# Patient Record
Sex: FEMALE | Race: BLACK OR AFRICAN AMERICAN | Employment: UNEMPLOYED | URBAN - METROPOLITAN AREA
[De-identification: names, ages, dates, MRNs, and addresses within clinical notes are randomized per-mention and may not be internally consistent; named-entity substitution may affect disease eponyms.]

---

## 2018-12-26 ENCOUNTER — HOSPITAL ENCOUNTER (OUTPATIENT)
Dept: GENERAL RADIOLOGY | Age: 43
Discharge: HOME | End: 2018-12-26
Payer: COMMERCIAL

## 2018-12-26 DIAGNOSIS — M54.50 LOW BACK PAIN: ICD-10-CM

## 2018-12-26 PROCEDURE — 72110 X-RAY EXAM L-2 SPINE 4/>VWS: CPT

## 2019-01-03 ENCOUNTER — HOSPITAL ENCOUNTER (OUTPATIENT)
Dept: MAMMOGRAPHY | Age: 44
Discharge: HOME | End: 2019-01-03
Attending: FAMILY MEDICINE
Payer: COMMERCIAL

## 2019-01-03 DIAGNOSIS — Z12.39 BREAST CANCER SCREENING: ICD-10-CM

## 2019-01-03 PROCEDURE — 77067 SCR MAMMO BI INCL CAD: CPT

## 2019-02-07 ENCOUNTER — HOSPITAL ENCOUNTER (OUTPATIENT)
Dept: MRI IMAGING | Age: 44
Discharge: HOME | End: 2019-02-07
Attending: ANESTHESIOLOGY
Payer: COMMERCIAL

## 2019-02-07 DIAGNOSIS — M54.16 LUMBAR RADICULOPATHY: ICD-10-CM

## 2019-02-07 DIAGNOSIS — M54.12 CERVICAL RADICULOPATHY: ICD-10-CM

## 2019-02-07 PROCEDURE — A9575 INJ GADOTERATE MEGLUMI 0.1ML: HCPCS

## 2019-02-07 PROCEDURE — 74011250636 HC RX REV CODE- 250/636

## 2019-02-07 PROCEDURE — 72158 MRI LUMBAR SPINE W/O & W/DYE: CPT

## 2019-02-07 PROCEDURE — 72141 MRI NECK SPINE W/O DYE: CPT

## 2019-02-07 RX ORDER — GADOTERATE MEGLUMINE 376.9 MG/ML
22 INJECTION INTRAVENOUS
Status: COMPLETED | OUTPATIENT
Start: 2019-02-07 | End: 2019-02-07

## 2019-02-07 RX ORDER — SODIUM CHLORIDE 0.9 % (FLUSH) 0.9 %
10 SYRINGE (ML) INJECTION
Status: COMPLETED | OUTPATIENT
Start: 2019-02-07 | End: 2019-02-07

## 2019-02-07 RX ADMIN — GADOTERATE MEGLUMINE 22 ML: 376.9 INJECTION INTRAVENOUS at 11:08

## 2019-02-07 RX ADMIN — Medication 10 ML: at 11:08

## 2019-02-21 ENCOUNTER — HOSPITAL ENCOUNTER (OUTPATIENT)
Dept: ULTRASOUND IMAGING | Age: 44
Discharge: HOME | End: 2019-02-21
Attending: ANESTHESIOLOGY
Payer: COMMERCIAL

## 2019-02-21 DIAGNOSIS — E04.2 MULTIPLE THYROID NODULES: ICD-10-CM

## 2019-02-21 PROCEDURE — 76536 US EXAM OF HEAD AND NECK: CPT

## 2020-02-04 ENCOUNTER — HOSPITAL ENCOUNTER (OUTPATIENT)
Dept: MAMMOGRAPHY | Age: 45
Discharge: HOME | End: 2020-02-04
Attending: FAMILY MEDICINE
Payer: COMMERCIAL

## 2020-02-04 DIAGNOSIS — Z12.31 SCREENING MAMMOGRAM FOR HIGH-RISK PATIENT: ICD-10-CM

## 2020-02-04 PROCEDURE — 77067 SCR MAMMO BI INCL CAD: CPT

## 2020-10-30 ENCOUNTER — TRANSCRIBE ORDER (OUTPATIENT)
Dept: SCHEDULING | Age: 45
End: 2020-10-30

## 2020-10-30 DIAGNOSIS — M54.16 LUMBAR RADICULOPATHY: Primary | ICD-10-CM

## 2020-11-06 ENCOUNTER — TRANSCRIBE ORDER (OUTPATIENT)
Dept: SCHEDULING | Age: 45
End: 2020-11-06

## 2020-11-06 DIAGNOSIS — M54.16 LUMBAR RADICULOPATHY: Primary | ICD-10-CM

## 2020-11-19 ENCOUNTER — HOSPITAL ENCOUNTER (OUTPATIENT)
Dept: MRI IMAGING | Age: 45
Discharge: HOME | End: 2020-11-19
Attending: NURSE PRACTITIONER
Payer: COMMERCIAL

## 2020-11-19 DIAGNOSIS — M54.16 LUMBAR RADICULOPATHY: ICD-10-CM

## 2020-11-19 PROCEDURE — 74011250636 HC RX REV CODE- 250/636

## 2020-11-19 PROCEDURE — 72158 MRI LUMBAR SPINE W/O & W/DYE: CPT

## 2020-11-19 PROCEDURE — A9575 INJ GADOTERATE MEGLUMI 0.1ML: HCPCS

## 2020-11-19 RX ORDER — SODIUM CHLORIDE 0.9 % (FLUSH) 0.9 %
10 SYRINGE (ML) INJECTION
Status: COMPLETED | OUTPATIENT
Start: 2020-11-19 | End: 2020-11-19

## 2020-11-19 RX ORDER — GADOTERATE MEGLUMINE 376.9 MG/ML
20 INJECTION INTRAVENOUS
Status: COMPLETED | OUTPATIENT
Start: 2020-11-19 | End: 2020-11-19

## 2020-11-19 RX ADMIN — Medication 10 ML: at 09:54

## 2020-11-19 RX ADMIN — GADOTERATE MEGLUMINE 20 ML: 376.9 INJECTION INTRAVENOUS at 09:54

## 2021-01-08 ENCOUNTER — HOSPITAL ENCOUNTER (OUTPATIENT)
Dept: MRI IMAGING | Age: 46
Discharge: HOME | End: 2021-01-08
Attending: NEUROLOGICAL SURGERY

## 2021-01-08 DIAGNOSIS — R52 BURNING PAIN: ICD-10-CM

## 2021-01-08 DIAGNOSIS — M54.50 CHRONIC BILATERAL LOW BACK PAIN WITHOUT SCIATICA: ICD-10-CM

## 2021-01-08 DIAGNOSIS — R26.89 IMBALANCE: ICD-10-CM

## 2021-01-08 DIAGNOSIS — G89.29 CHRONIC BILATERAL LOW BACK PAIN WITHOUT SCIATICA: ICD-10-CM

## 2021-01-08 DIAGNOSIS — R29.898 WEAKNESS OF RIGHT LOWER EXTREMITY: ICD-10-CM

## 2021-01-11 ENCOUNTER — TRANSCRIBE ORDER (OUTPATIENT)
Dept: SCHEDULING | Age: 46
End: 2021-01-11

## 2021-01-11 DIAGNOSIS — Z12.31 VISIT FOR SCREENING MAMMOGRAM: Primary | ICD-10-CM

## 2021-01-18 ENCOUNTER — HOSPITAL ENCOUNTER (OUTPATIENT)
Dept: MRI IMAGING | Age: 46
Discharge: HOME | End: 2021-01-18
Attending: NEUROLOGICAL SURGERY
Payer: COMMERCIAL

## 2021-01-18 PROCEDURE — 72157 MRI CHEST SPINE W/O & W/DYE: CPT

## 2021-01-18 PROCEDURE — 74011250636 HC RX REV CODE- 250/636: Performed by: NEUROLOGICAL SURGERY

## 2021-01-18 PROCEDURE — A9575 INJ GADOTERATE MEGLUMI 0.1ML: HCPCS | Performed by: NEUROLOGICAL SURGERY

## 2021-01-18 RX ORDER — SODIUM CHLORIDE 0.9 % (FLUSH) 0.9 %
10 SYRINGE (ML) INJECTION
Status: COMPLETED | OUTPATIENT
Start: 2021-01-18 | End: 2021-01-18

## 2021-01-18 RX ORDER — GADOTERATE MEGLUMINE 376.9 MG/ML
20 INJECTION INTRAVENOUS
Status: COMPLETED | OUTPATIENT
Start: 2021-01-18 | End: 2021-01-18

## 2021-01-18 RX ADMIN — Medication 10 ML: at 08:28

## 2021-01-18 RX ADMIN — GADOTERATE MEGLUMINE 20 ML: 376.9 INJECTION INTRAVENOUS at 08:28

## 2021-02-08 ENCOUNTER — HOSPITAL ENCOUNTER (OUTPATIENT)
Dept: MAMMOGRAPHY | Age: 46
Discharge: HOME | End: 2021-02-08
Attending: FAMILY MEDICINE
Payer: COMMERCIAL

## 2021-02-08 DIAGNOSIS — Z12.31 VISIT FOR SCREENING MAMMOGRAM: ICD-10-CM

## 2021-02-08 PROCEDURE — 77067 SCR MAMMO BI INCL CAD: CPT

## 2021-10-07 ENCOUNTER — APPOINTMENT (OUTPATIENT)
Dept: GENERAL RADIOLOGY | Age: 46
End: 2021-10-07
Attending: ANESTHESIOLOGY
Payer: COMMERCIAL

## 2021-10-07 ENCOUNTER — HOSPITAL ENCOUNTER (OUTPATIENT)
Age: 46
Setting detail: OUTPATIENT SURGERY
Discharge: HOME | End: 2021-10-07
Attending: ANESTHESIOLOGY | Admitting: ANESTHESIOLOGY
Payer: COMMERCIAL

## 2021-10-07 VITALS
TEMPERATURE: 97.5 F | WEIGHT: 220 LBS | DIASTOLIC BLOOD PRESSURE: 69 MMHG | BODY MASS INDEX: 43.19 KG/M2 | RESPIRATION RATE: 15 BRPM | SYSTOLIC BLOOD PRESSURE: 107 MMHG | HEART RATE: 82 BPM | HEIGHT: 60 IN | OXYGEN SATURATION: 100 %

## 2021-10-07 PROCEDURE — 77030003545 HC NDL EPDRL EPIM -B: Performed by: ANESTHESIOLOGY

## 2021-10-07 PROCEDURE — 74011000250 HC RX REV CODE- 250: Performed by: ANESTHESIOLOGY

## 2021-10-07 PROCEDURE — 74011250636 HC RX REV CODE- 250/636: Performed by: ANESTHESIOLOGY

## 2021-10-07 PROCEDURE — 77030014125 HC TY EPDRL BBMI -B: Performed by: ANESTHESIOLOGY

## 2021-10-07 PROCEDURE — A4300 CATH IMPL VASC ACCESS PORTAL: HCPCS | Performed by: ANESTHESIOLOGY

## 2021-10-07 PROCEDURE — 77030003543 HC NDL EPDRL BBMI -A: Performed by: ANESTHESIOLOGY

## 2021-10-07 PROCEDURE — 76210000020 HC REC RM PH II FIRST 0.5 HR: Performed by: ANESTHESIOLOGY

## 2021-10-07 PROCEDURE — 76210000006 HC OR PH I REC 0.5 TO 1 HR: Performed by: ANESTHESIOLOGY

## 2021-10-07 PROCEDURE — 74011000636 HC RX REV CODE- 636: Performed by: ANESTHESIOLOGY

## 2021-10-07 PROCEDURE — 76010000154 HC OR TIME FIRST 0.5 HR: Performed by: ANESTHESIOLOGY

## 2021-10-07 PROCEDURE — 2709999900 HC NON-CHARGEABLE SUPPLY: Performed by: ANESTHESIOLOGY

## 2021-10-07 RX ORDER — SODIUM CHLORIDE 9 MG/ML
INJECTION INTRAMUSCULAR; INTRAVENOUS; SUBCUTANEOUS AS NEEDED
Status: DISCONTINUED | OUTPATIENT
Start: 2021-10-07 | End: 2021-10-07 | Stop reason: HOSPADM

## 2021-10-07 RX ORDER — MIDAZOLAM HYDROCHLORIDE 1 MG/ML
2 INJECTION, SOLUTION INTRAMUSCULAR; INTRAVENOUS ONCE
Status: COMPLETED | OUTPATIENT
Start: 2021-10-07 | End: 2021-10-07

## 2021-10-07 RX ORDER — TRIAMCINOLONE ACETONIDE 40 MG/ML
INJECTION, SUSPENSION INTRA-ARTICULAR; INTRAMUSCULAR AS NEEDED
Status: DISCONTINUED | OUTPATIENT
Start: 2021-10-07 | End: 2021-10-07 | Stop reason: HOSPADM

## 2021-10-07 RX ORDER — LIDOCAINE HYDROCHLORIDE 10 MG/ML
INJECTION INFILTRATION; PERINEURAL AS NEEDED
Status: DISCONTINUED | OUTPATIENT
Start: 2021-10-07 | End: 2021-10-07 | Stop reason: HOSPADM

## 2021-10-07 RX ADMIN — MIDAZOLAM 2 MG: 1 INJECTION INTRAMUSCULAR; INTRAVENOUS at 08:30

## 2021-10-07 NOTE — PROGRESS NOTES
's pre-procedure visit and prayer with patient as requested.     Estefany Valenzuela MDiv, BS  Board Certified

## 2021-10-07 NOTE — BRIEF OP NOTE
Brief Postoperative Note    Patient: Garth Bellamy  YOB: 1975  MRN: [id number]    Date of Procedure: 10/7/2021     Pre-Op Diagnosis: Lumbar radiculopathy [M54.16]    Post-Op Diagnosis: Same as preoperative diagnosis.       Procedure(s):  CAUDAL  EPIDURAL STEROID INJECTION WITH LYSIS OF ADHESIONS    Surgeon(s):  Kimberley Barnes MD    Surgical Assistant: None    Anesthesia: Con-Sed     Estimated Blood Loss (mL): Minimal    Complications: None    Specimens: * No specimens in log *     Implants: * No implants in log *    Drains: * No LDAs found *    Findings: None    Electronically Signed by Jorge Santana MD on 10/7/2021 at 9:00 AM

## 2021-10-07 NOTE — H&P
History and Physical  .         History and Physical    Patient: Renita Quintanilla MRN: [id number]  SSN: [id number]    YOB: 1975  Age: 55 y.o. Sex: female      Subjective:      Renita Quintanilla is a 55 y.o. female who has chronic low back and leg pain s/p L5 laminectomy presenting for a caudal epidural lysis of adhesions. Past Medical History:   Diagnosis Date    Anxiety     AR (allergic rhinitis)     Arthritis     back    Asthma     rescue inhaler    Chronic back pain          Chronic pain syndrome     Depression     History of methicillin resistant Staph aureus     Hypertension     managed with meds    Morbid obesity ([address]      55 bmi    Neuropathy of right foot          Pure hypercholesterolemia     Sciatica     Sinus problem      Past Surgical History:   Procedure Laterality Date    HX BACK SURGERY  2015    spinal cord stimulator trial- removed    HX  SECTION      x2    HX GYN  2015     uterine ablation    Isauro Ayala (x 2)   Isauro Borjas (x1),    HX TUBAL LIGATION      w last CS      Family History   Problem Relation Age of Onset   [employer] Hypertension Mother     Heart Disease Mother    [employer] Stroke Mother     Diabetes Mother    [employer] Delayed Awakening Mother     Sleep Apnea Mother     Cancer Father         prostate    Diabetes Father     Hypertension Father     Asthma Father     High Cholesterol Brother     Sleep Apnea Brother     Other Other         brain tumor    Arthritis-osteo Other     Breast Cancer Neg Hx     Colon Cancer Neg Hx     Ovarian Cancer Neg Hx      Social History     Tobacco Use    Smoking status: Never Smoker    Smokeless tobacco: Never Used   Substance Use Topics    Alcohol use: Yes     Comment: 1-2 drinks per month      Prior to Admission medications    Medication Sig Start Date End Date Taking?  Authorizing Provider   hydrALAZINE (APRESOLINE) 25 mg tablet Take 25 mg by mouth daily. Yes Provider, Historical   HYDROcodone-acetaminophen (NORCO) 7.5-325 mg per tablet Take  by mouth every six (6) hours as needed for Pain. Yes Provider, Historical   acetaminophen (Tylenol Extra Strength) 500 mg tablet Take  by mouth as needed for Pain. Yes Provider, Historical   azelastine-fluticasone 137-50 mcg/spray spry SPRAY 1 SPRAY INTO EACH NOSTRIL TWICE A DAY 12/8/20  Yes Provider, Historical   olopatadine (PATADAY) 0.2 % drop ophthalmic solution INSTILL 1 DROP INTO BOTH EYES DAILY 10/31/20  Yes Provider, Historical   losartan (COZAAR) 100 mg tablet TAKE 1 TABLET BY MOUTH EVERY DAY 2/24/20  Yes Provider, Historical   amLODIPine (NORVASC) 10 mg tablet Take 1 Tab by mouth every morning. 10/29/18  Yes Dilcia Moya MD   montelukast (SINGULAIR) 10 mg tablet Take 1 Tab by mouth daily. Patient taking differently: Take 10 mg by mouth nightly. 9/13/16  Yes Dilcia Moya MD   MULTIVITAMIN PO Take 1 Tab by mouth every morning. hold until after surgery 12/16/15  Indications: hold until after surgery 12/16/15   Yes Provider, Historical   orlistat (XENICAL) 120 mg capsule Take 120 mg by mouth three (3) times daily (with meals). 1/25/21 1/25/22  Provider, Historical   ibuprofen (MOTRIN) 200 mg tablet Take  by mouth as needed for Pain. Provider, Historical   naproxen sodium (Aleve) 220 mg cap Take  by mouth as needed. Provider, Historical   phentermine 30 mg capsule Take 30 mg by mouth Every morning. 12/21/20   Provider, Historical   guaiFENesin-codeine (GUAIFENESIN AC) 100-10 mg/5 mL solution Take 5 mL by mouth four (4) times daily as needed for Cough. Max Daily Amount: 20 mL. Indications: Cough 4/27/18   Dilcia Moya MD        Allergies   Allergen Reactions    Adhesive Rash    Pcn [Penicillins] Itching and Swelling       Review of Systems:  A comprehensive review of systems was negative except for that written in the History of Present Illness.     Objective: Vitals:    10/07/21 0739 10/07/21 0744   BP: (!) 149/93    Pulse: (!) 108    Resp: 18    Temp: 98.2 °F (36.8 °C)    SpO2: 100%    Weight: 99.8 kg (220 lb)    Height:  5' (1.524 m)        Physical Exam:  GENERAL: alert, cooperative, no distress, appears stated age  Heart: RRR  Chest CTAB/L    Assessment:     Hospital Problems  Date Reviewed: 10/7/2021    None          Plan:     Caudal epidural lysis of adhesions with steroid injection with sedation    Signed By: Melissa Bhat MD     October 7, 2021

## 2021-10-07 NOTE — OP NOTES
Location: [facility]    Procedure: Caudal epidural lysis of adhesions with steroid injection with epidurography and sedation    Surgeon: Katty Cooney MD    Preoperative Diagnosis: Lumbar radiculitis and failed back surgery syndrome    Description: With consent obtained and the patient in the prone position on an abdominal bolster, with blood pressure, heart rate, and oxygen saturation monitored, the patient was prepped and draped in sterile fashion using Betadine. IV sedation was employed using midazolam. We proceeded to identify the sacral hiatus under fluoroscopy. The skin and subcutaneous tissue was anesthetized with 1% lidocaine. An 17 gauge 3.5 inch Tuohy needle was used under fluoroscopic guidance to be placed through the sacral hiatus and up to the level of S3. Placement was confirmed with injection of Omnipaque 240 using AP and lateral imaging. There was spread after injection of 3mL of Omnipaque to the sacral nerve roots and epidural space stopping at the L5/S1 junction. There was no vascular uptake, CSF spread, or paresthesia elicited. At this point, a 19 gauge Brevi Cath XL was used to advance to the right gutter and advanced to L5/S1. Injection of contrast showed spread around the S1 nerve root and hyaluronidase 150 units in 9mL of preservative free saline injected. The catherter could not be further advanced and a mixture of Kenalog 40 mg in preservative-free saline and 1% preservative-free lidocaine was injected for a total volume of 5mL. The needle was removed and a bandage applied. The patient tolerated the procedure well and there were no apparent complications. Postoperatively there was no change in lower extremity strength or sensation. The patient was discharged home in stable condition with a follow up appointment. Blu Cheng

## 2021-10-07 NOTE — DISCHARGE INSTRUCTIONS
Ice to area for 24 hours, no heat. ACTIVITY  · As tolerated and as directed by your doctor. · Bathe or shower as directed by your doctor. DIET  · Clear liquids until no nausea or vomiting; then light diet for the first day. · Advance to regular diet on second day, unless your doctor orders otherwise. · If nausea and vomiting continues, call your doctor. PAIN  · Take pain medication as directed by your doctor. · Call your doctor if pain is NOT relieved by medication. · DO NOT take aspirin of blood thinners unless directed by your doctor. MEDICATION INTERACTION:During your procedure you potentially received a medication or medications which may reduce the effectiveness of oral contraceptives. Please consider other forms of contraception for 1 month following your procedure if you are currently using oral contraceptives as your primary form of birth control. In addition to this, we recommend continuing your oral contraceptive as prescribed, unless otherwise instructed by your physician, during this time      [address] IF   · Excessive bleeding that does not stop after holding pressure over the area  · Temperature of 101 degrees F or above  · Excessive redness, swelling or bruising, and/ or green or yellow, smelly discharge from incision    After general anesthesia or intravenous sedation, for 24 hours or while taking prescription Narcotics:  · Limit your activities  · A responsible adult needs to be with you for the next 24 hours  · Do not drive and operate hazardous machinery  · Do not make important personal or business decisions  · Do not drink alcoholic beverages  · If you have not urinated within 8 hours after discharge, and you are experiencing discomfort from urinary retention, please go to the nearest ED. · If you have sleep apnea and have a CPAP machine, please use it for all naps and sleeping.   · Please use caution when taking narcotics and any of your home medications that may cause drowsiness. *  Please give a list of your current medications to your Primary Care Provider. *  Please update this list whenever your medications are discontinued, doses are      changed, or new medications (including over-the-counter products) are added. *  Please carry medication information at all times in case of emergency situations. These are general instructions for a healthy lifestyle:  No smoking/ No tobacco products/ Avoid exposure to second hand smoke  Surgeon General's Warning:  Quitting smoking now greatly reduces serious risk to your health. Obesity, smoking, and sedentary lifestyle greatly increases your risk for illness  A healthy diet, regular physical exercise & weight monitoring are important for maintaining a healthy lifestyle    You may be retaining fluid if you have a history of heart failure or if you experience any of the following symptoms:  Weight gain of 3 pounds or more overnight or 5 pounds in a week, increased swelling in our hands or feet or shortness of breath while lying flat in bed. Please call your doctor as soon as you notice any of these symptoms; do not wait until your next office visit.

## 2022-01-13 ENCOUNTER — TRANSCRIBE ORDER (OUTPATIENT)
Dept: SCHEDULING | Age: 47
End: 2022-01-13

## 2022-01-13 DIAGNOSIS — Z12.31 VISIT FOR SCREENING MAMMOGRAM: Primary | ICD-10-CM

## 2022-02-11 ENCOUNTER — HOSPITAL ENCOUNTER (OUTPATIENT)
Dept: MAMMOGRAPHY | Age: 47
Discharge: HOME | End: 2022-02-11
Attending: FAMILY MEDICINE
Payer: COMMERCIAL

## 2022-02-11 DIAGNOSIS — Z12.31 VISIT FOR SCREENING MAMMOGRAM: ICD-10-CM

## 2022-02-11 PROCEDURE — 77067 SCR MAMMO BI INCL CAD: CPT

## 2022-12-28 ENCOUNTER — HOSPITAL ENCOUNTER (OUTPATIENT)
Dept: MRI IMAGING | Age: 47
Discharge: HOME | End: 2022-12-31
Payer: MEDICARE

## 2022-12-28 DIAGNOSIS — M54.6 PAIN IN THORACIC SPINE: ICD-10-CM

## 2022-12-28 PROCEDURE — A9579 GAD-BASE MR CONTRAST NOS,1ML: HCPCS | Performed by: NURSE PRACTITIONER

## 2022-12-28 PROCEDURE — 6360000004 HC RX CONTRAST MEDICATION: Performed by: NURSE PRACTITIONER

## 2022-12-28 PROCEDURE — 72158 MRI LUMBAR SPINE W/O & W/DYE: CPT

## 2022-12-28 RX ADMIN — GADOTERIDOL 20 ML: 279.3 INJECTION, SOLUTION INTRAVENOUS at 18:18

## 2023-02-06 ENCOUNTER — TRANSCRIBE ORDERS (OUTPATIENT)
Dept: SCHEDULING | Age: 48
End: 2023-02-06

## 2023-02-06 DIAGNOSIS — Z12.31 SCREENING MAMMOGRAM FOR HIGH-RISK PATIENT: Primary | ICD-10-CM

## 2023-02-14 ENCOUNTER — HOSPITAL ENCOUNTER (OUTPATIENT)
Dept: MAMMOGRAPHY | Age: 48
Discharge: HOME | End: 2023-02-17
Payer: COMMERCIAL

## 2023-02-14 DIAGNOSIS — Z12.31 SCREENING MAMMOGRAM FOR HIGH-RISK PATIENT: ICD-10-CM

## 2023-02-14 PROCEDURE — 77067 SCR MAMMO BI INCL CAD: CPT

## 2023-07-05 ENCOUNTER — OFFICE VISIT (OUTPATIENT)
Dept: NEUROSURGERY | Age: 48
End: 2023-07-05
Payer: MEDICARE

## 2023-07-05 VITALS
BODY MASS INDEX: 46.55 KG/M2 | HEIGHT: 60 IN | TEMPERATURE: 97.3 F | OXYGEN SATURATION: 98 % | HEART RATE: 114 BPM | SYSTOLIC BLOOD PRESSURE: 140 MMHG | WEIGHT: 237.13 LBS | DIASTOLIC BLOOD PRESSURE: 91 MMHG

## 2023-07-05 DIAGNOSIS — Z98.890 HISTORY OF LUMBAR DISCECTOMY: ICD-10-CM

## 2023-07-05 DIAGNOSIS — G89.4 CHRONIC PAIN SYNDROME: ICD-10-CM

## 2023-07-05 DIAGNOSIS — M48.061 NEUROFORAMINAL STENOSIS OF LUMBAR SPINE: ICD-10-CM

## 2023-07-05 DIAGNOSIS — E66.01 MORBID OBESITY WITH BMI OF 45.0-49.9, ADULT (HCC): ICD-10-CM

## 2023-07-05 DIAGNOSIS — Z98.890 HISTORY OF LUMBAR LAMINECTOMY: Primary | ICD-10-CM

## 2023-07-05 PROCEDURE — 1036F TOBACCO NON-USER: CPT | Performed by: NEUROLOGICAL SURGERY

## 2023-07-05 PROCEDURE — 3080F DIAST BP >= 90 MM HG: CPT | Performed by: NEUROLOGICAL SURGERY

## 2023-07-05 PROCEDURE — G8427 DOCREV CUR MEDS BY ELIG CLIN: HCPCS | Performed by: NEUROLOGICAL SURGERY

## 2023-07-05 PROCEDURE — 3077F SYST BP >= 140 MM HG: CPT | Performed by: NEUROLOGICAL SURGERY

## 2023-07-05 PROCEDURE — 99214 OFFICE O/P EST MOD 30 MIN: CPT | Performed by: NEUROLOGICAL SURGERY

## 2023-07-05 PROCEDURE — G8417 CALC BMI ABV UP PARAM F/U: HCPCS | Performed by: NEUROLOGICAL SURGERY

## 2023-07-05 RX ORDER — BACLOFEN 10 MG/1
10 TABLET ORAL 3 TIMES DAILY
COMMUNITY

## 2023-07-05 NOTE — PROGRESS NOTES
[facility] NOTE:   History of Present Illness:    CC: Follow-up evaluation of chronic low back and right lower extremity pain    Aracely Rubio is a 52 y.o. female who presents today for follow-up evaluation of chronic low back pain and right lower extremity pain. The patient has undergone conservative measures she is now doing water therapy which she is happy with. She has a constant low back pain that she rates as a 5 out of 10 on a visual analog pain scale. She states that she is beginning to experience some left lower extremity pain as well as the right lower extremity pain. The patient has a past surgical history significant for a right-sided L5-S1 disc herniation that was treated with a right-sided L5-S1 laminotomy and discectomy and foraminotomies performed on 12/31/2013 she then subsequently underwent a redo right L5-S1 laminotomy and discectomy for recurrent right-sided L5-S1 disc herniation performed on 9/22/2014, she was treated with a spinal cord stimulator placement performed 9/14/2015 for postlaminectomy syndrome with low back and right leg pain, followed by spinal cord stimulator removal for treatment of a spinal cord stimulator lead infection performed on 12/21/2015. All the aforementioned surgeries were performed by Dr. Emely Eason an orthopedic spine surgeon with [facility]. She underwent an EMG/NCV study performed on 1/26/2022 that demonstrated electrodiagnostic evidence most consistent with a chronic S1 radiculopathy without evidence of peripheral neuropathy or left lower limb radiculopathy. She has undergone 2 more epidural steroid injections since her last visit here on 2/2/2022 and states that she gets a short relief of pain for a few hours. The patient has an MRI lumbar spine with and without contrast demonstrating complete disc height collapse at L5-S1 with slight retrolisthesis of L5 in relation to S1.   There is

## 2023-07-05 NOTE — PROGRESS NOTES
Review of Systems    Constitutional:                    No recent weight changes, fever, fatiguePOSITIVE sleep difficulties, loss of appetite   ENT/Mouth:  No hearing loss, ringing in the ears, POSITIVE chronic sinus problem, nose bleeds,sore throat, voice change, hoarseness, swollen glands in neck, or difficulties with chewing and swallowing. Cardiovascular:  No chest pain/angina pectoris, palpitations, POSITIVE swelling of feet/ankles/hands, POSITIVE or calf pain while walking. Respiratory: No chronic or frequent coughs, spitting up blood, shortness of breath, POSITIVE asthma, or wheezing. Gastrointestinal: No a bdominal pain, heartburn, nausea, vomiting, constipation, or frequent diarrhea     Genitourinary: No frequent urination, burning or painful urination, or blood in urine     Musculoskeletal:   POSITIVE ALLjoint pain, stiffness/swelling, weakness of muscles, or muscle pain/cramp     Integument:   POSITIVE rash/itching     Neurological:  POSITIVE dizziness/vertigo,POSITIVE numbness/tingling sensation, tremors, POSITIVE weakness in limbs, frequent or recurring headaches, memory loss or confusion. Endocrine: No excessive thirst or urination, heat or cold intolerance.

## 2023-07-24 ENCOUNTER — OFFICE VISIT (OUTPATIENT)
Dept: ORTHOPEDIC SURGERY | Age: 48
End: 2023-07-24
Payer: MEDICARE

## 2023-07-24 VITALS — BODY MASS INDEX: 46.53 KG/M2 | WEIGHT: 237 LBS | HEIGHT: 60 IN

## 2023-07-24 DIAGNOSIS — M54.42 CHRONIC LOW BACK PAIN WITH BILATERAL SCIATICA, UNSPECIFIED BACK PAIN LATERALITY: Primary | ICD-10-CM

## 2023-07-24 DIAGNOSIS — M54.41 CHRONIC LOW BACK PAIN WITH BILATERAL SCIATICA, UNSPECIFIED BACK PAIN LATERALITY: Primary | ICD-10-CM

## 2023-07-24 DIAGNOSIS — M51.36 DDD (DEGENERATIVE DISC DISEASE), LUMBAR: ICD-10-CM

## 2023-07-24 DIAGNOSIS — G89.29 CHRONIC LOW BACK PAIN WITH BILATERAL SCIATICA, UNSPECIFIED BACK PAIN LATERALITY: Primary | ICD-10-CM

## 2023-07-24 PROCEDURE — 1036F TOBACCO NON-USER: CPT | Performed by: ORTHOPAEDIC SURGERY

## 2023-07-24 PROCEDURE — G8428 CUR MEDS NOT DOCUMENT: HCPCS | Performed by: ORTHOPAEDIC SURGERY

## 2023-07-24 PROCEDURE — 99203 OFFICE O/P NEW LOW 30 MIN: CPT | Performed by: ORTHOPAEDIC SURGERY

## 2023-07-24 PROCEDURE — G8417 CALC BMI ABV UP PARAM F/U: HCPCS | Performed by: ORTHOPAEDIC SURGERY

## 2023-10-02 ENCOUNTER — OFFICE VISIT (OUTPATIENT)
Dept: SURGERY | Age: 48
End: 2023-10-02
Payer: MEDICARE

## 2023-10-02 VITALS
HEART RATE: 121 BPM | HEIGHT: 61 IN | SYSTOLIC BLOOD PRESSURE: 143 MMHG | DIASTOLIC BLOOD PRESSURE: 93 MMHG | BODY MASS INDEX: 44.93 KG/M2 | WEIGHT: 238 LBS

## 2023-10-02 DIAGNOSIS — Z13.21 ENCOUNTER FOR VITAMIN DEFICIENCY SCREENING: ICD-10-CM

## 2023-10-02 DIAGNOSIS — Z01.812 ENCOUNTER FOR PRE-OPERATIVE LABORATORY TESTING: ICD-10-CM

## 2023-10-02 DIAGNOSIS — K21.9 GASTROESOPHAGEAL REFLUX DISEASE WITHOUT ESOPHAGITIS: ICD-10-CM

## 2023-10-02 DIAGNOSIS — I10 PRIMARY HYPERTENSION: ICD-10-CM

## 2023-10-02 DIAGNOSIS — E66.01 OBESITY, CLASS III, BMI 40-49.9 (MORBID OBESITY) (HCC): ICD-10-CM

## 2023-10-02 DIAGNOSIS — E66.01 MORBID OBESITY (HCC): Primary | ICD-10-CM

## 2023-10-02 DIAGNOSIS — E66.01 MORBID OBESITY (HCC): ICD-10-CM

## 2023-10-02 DIAGNOSIS — Z71.89 ENCOUNTER FOR PRE-BARIATRIC SURGERY COUNSELING AND EDUCATION: ICD-10-CM

## 2023-10-02 LAB
25(OH)D3 SERPL-MCNC: 27.5 NG/ML (ref 30–100)
EST. AVERAGE GLUCOSE BLD GHB EST-MCNC: 131 MG/DL
FERRITIN SERPL-MCNC: 123 NG/ML (ref 8–388)
FOLATE SERPL-MCNC: 7.3 NG/ML (ref 3.1–17.5)
HBA1C MFR BLD: 6.2 % (ref 4.8–5.6)
IRON SERPL-MCNC: 85 UG/DL (ref 35–150)
TSH, 3RD GENERATION: 0.82 UIU/ML (ref 0.36–3.74)
VIT B12 SERPL-MCNC: 361 PG/ML (ref 193–986)

## 2023-10-02 PROCEDURE — APPSS30 APP SPLIT SHARED TIME 16-30 MINUTES: Performed by: PHYSICIAN ASSISTANT

## 2023-10-02 PROCEDURE — 3080F DIAST BP >= 90 MM HG: CPT | Performed by: SURGERY

## 2023-10-02 PROCEDURE — 1036F TOBACCO NON-USER: CPT | Performed by: SURGERY

## 2023-10-02 PROCEDURE — 3077F SYST BP >= 140 MM HG: CPT | Performed by: SURGERY

## 2023-10-02 PROCEDURE — 99204 OFFICE O/P NEW MOD 45 MIN: CPT | Performed by: SURGERY

## 2023-10-05 LAB
COTININE SERPL-MCNC: <1 NG/ML
NICOTINE SERPL-MCNC: <1 NG/ML

## 2023-10-20 ENCOUNTER — HOSPITAL ENCOUNTER (OUTPATIENT)
Dept: PHYSICAL THERAPY | Age: 48
Setting detail: RECURRING SERIES
Discharge: HOME | End: 2023-10-23
Attending: SURGERY
Payer: MEDICARE

## 2023-10-20 PROCEDURE — 97162 PT EVAL MOD COMPLEX 30 MIN: CPT

## 2023-10-20 NOTE — THERAPY EVALUATION
Ludwig Dhillon  : 1975  Primary: Medicare Part A And B (Medicare)  Secondary: [facility] @ [address] [place] [address] [facility] [address]  Phone: [phone]  Fax: [phone]    Plan of Care/Certification Expiration Date: 10/20/23      PT Visit Info:    Plan of Care/Certification Expiration Date: 10/20/23  Total # of Visits to Date: 1      Visit Count:  1                OUTPATIENT PHYSICAL THERAPY:             Initial Assessment 10/20/2023               Episode  Appt Desk         Treatment Diagnosis:  Difficulty in walking (R26.2); Generalized muscle weakness (M62.81)   Medical/Referring Diagnosis:  Morbid obesity ([address]) [E66.01]  Encounter for pre-operative laboratory testing [Z01.812]  Encounter for pre-bariatric surgery counseling and education [Z71.89]  Referring Physician:  Yan Sweeney MD MD Orders:  PT Eval and Treat   Return MD Appt:  tbd  Date of Onset:       Allergies:  Latex, Adhesive tape, Penicillins, Pollen extract, Cat hair extract, Dog epithelium allergy skin test, and Dust mite extract  Restrictions/Precautions:           Medications Last Reviewed:  10/20/2023     SUBJECTIVE   History of Injury/Illness (Reason for Referral):  Patient is here for the PT portion of the surgical weight loss program. Pt has started walking around her home and is doing 10-15 min twice daily . She has not been doing any strengthening so we went over ex in notebook with resistance bands and issued bands for home. Patient Stated Goal(s):  \"to learn exercises\"  Initial:   7   /10 Post Session:      /10  Pt has chronic back pain.   Past Medical History/Comorbidities:   Ms. Daniel Tyson  has a past medical history of Anxiety, AR (allergic rhinitis), Arthritis, Asthma, Chronic back pain, Chronic pain syndrome, Depression, History of methicillin resistant Staph aureus, Hypertension, Morbid obesity ([address]), Neuropathy of right foot, Pure hypercholesterolemia,

## 2023-10-23 ENCOUNTER — HOSPITAL ENCOUNTER (OUTPATIENT)
Dept: GENERAL RADIOLOGY | Age: 48
Discharge: HOME | End: 2023-10-26
Attending: SURGERY
Payer: COMMERCIAL

## 2023-10-23 ENCOUNTER — OFFICE VISIT (OUTPATIENT)
Dept: SURGERY | Age: 48
End: 2023-10-23
Payer: COMMERCIAL

## 2023-10-23 VITALS
SYSTOLIC BLOOD PRESSURE: 157 MMHG | HEIGHT: 61 IN | DIASTOLIC BLOOD PRESSURE: 92 MMHG | WEIGHT: 236 LBS | BODY MASS INDEX: 44.56 KG/M2 | HEART RATE: 110 BPM

## 2023-10-23 DIAGNOSIS — K21.9 GASTROESOPHAGEAL REFLUX DISEASE WITHOUT ESOPHAGITIS: ICD-10-CM

## 2023-10-23 DIAGNOSIS — E66.01 OBESITY, CLASS III, BMI 40-49.9 (MORBID OBESITY) (HCC): ICD-10-CM

## 2023-10-23 DIAGNOSIS — Z71.89 ENCOUNTER FOR PRE-BARIATRIC SURGERY COUNSELING AND EDUCATION: ICD-10-CM

## 2023-10-23 DIAGNOSIS — Z71.82 EXERCISE COUNSELING: ICD-10-CM

## 2023-10-23 DIAGNOSIS — E66.01 MORBID OBESITY (HCC): ICD-10-CM

## 2023-10-23 DIAGNOSIS — E66.01 MORBID OBESITY (HCC): Primary | ICD-10-CM

## 2023-10-23 DIAGNOSIS — Z01.812 ENCOUNTER FOR PRE-OPERATIVE LABORATORY TESTING: ICD-10-CM

## 2023-10-23 DIAGNOSIS — R13.14 PHARYNGOESOPHAGEAL DYSPHAGIA: ICD-10-CM

## 2023-10-23 DIAGNOSIS — Z71.3 DIETARY COUNSELING: ICD-10-CM

## 2023-10-23 DIAGNOSIS — I10 PRIMARY HYPERTENSION: ICD-10-CM

## 2023-10-23 PROCEDURE — 1036F TOBACCO NON-USER: CPT | Performed by: PHYSICIAN ASSISTANT

## 2023-10-23 PROCEDURE — 6370000000 HC RX 637 (ALT 250 FOR IP): Performed by: PHYSICIAN ASSISTANT

## 2023-10-23 PROCEDURE — G8484 FLU IMMUNIZE NO ADMIN: HCPCS | Performed by: PHYSICIAN ASSISTANT

## 2023-10-23 PROCEDURE — 3078F DIAST BP <80 MM HG: CPT | Performed by: PHYSICIAN ASSISTANT

## 2023-10-23 PROCEDURE — G8417 CALC BMI ABV UP PARAM F/U: HCPCS | Performed by: PHYSICIAN ASSISTANT

## 2023-10-23 PROCEDURE — 74240 X-RAY XM UPR GI TRC 1CNTRST: CPT

## 2023-10-23 PROCEDURE — G8427 DOCREV CUR MEDS BY ELIG CLIN: HCPCS | Performed by: PHYSICIAN ASSISTANT

## 2023-10-23 PROCEDURE — 99215 OFFICE O/P EST HI 40 MIN: CPT | Performed by: PHYSICIAN ASSISTANT

## 2023-10-23 PROCEDURE — 3074F SYST BP LT 130 MM HG: CPT | Performed by: PHYSICIAN ASSISTANT

## 2023-10-23 PROCEDURE — 2500000003 HC RX 250 WO HCPCS: Performed by: PHYSICIAN ASSISTANT

## 2023-10-23 RX ADMIN — BARIUM SULFATE 1 TABLET: 700 TABLET ORAL at 09:35

## 2023-10-23 RX ADMIN — ANTACID/ANTIFLATULENT 1 EACH: 380; 550; 10; 10 GRANULE, EFFERVESCENT ORAL at 09:20

## 2023-10-23 RX ADMIN — BARIUM SULFATE 355 ML: 0.6 SUSPENSION ORAL at 09:28

## 2023-10-23 RX ADMIN — BARIUM SULFATE 140 ML: 980 POWDER, FOR SUSPENSION ORAL at 09:20

## 2023-10-24 PROBLEM — M19.90 ARTHRITIS: Status: ACTIVE | Noted: 2019-06-17

## 2023-10-24 PROBLEM — E78.00 PURE HYPERCHOLESTEROLEMIA: Status: ACTIVE | Noted: 2019-06-17

## 2023-10-24 PROBLEM — I10 ESSENTIAL HYPERTENSION, BENIGN: Status: ACTIVE | Noted: 2019-06-17

## 2023-10-24 PROBLEM — J30.9 AR (ALLERGIC RHINITIS): Status: ACTIVE | Noted: 2019-06-17

## 2023-10-24 PROBLEM — G89.4 CHRONIC PAIN DISORDER: Status: ACTIVE | Noted: 2019-06-17

## 2023-10-24 RX ORDER — DIAZEPAM 5 MG/1
TABLET ORAL
COMMUNITY
Start: 2023-09-07

## 2023-10-24 RX ORDER — HYDROCODONE BITARTRATE AND ACETAMINOPHEN 10; 325 MG/1; MG/1
TABLET ORAL
COMMUNITY
Start: 2023-09-26

## 2023-10-24 RX ORDER — HYDROCHLOROTHIAZIDE 25 MG/1
TABLET ORAL
COMMUNITY
Start: 2023-09-20

## 2023-10-24 RX ORDER — PHENTERMINE HYDROCHLORIDE 37.5 MG/1
37.5 CAPSULE ORAL EVERY MORNING
COMMUNITY
Start: 2023-08-26

## 2023-10-24 RX ORDER — BUPRENORPHINE HYDROCHLORIDE 150 UG/1
FILM, SOLUBLE BUCCAL
COMMUNITY
Start: 2023-08-26

## 2023-10-31 ENCOUNTER — OFFICE VISIT (OUTPATIENT)
Dept: BEHAVIORAL/MENTAL HEALTH CLINIC | Facility: CLINIC | Age: 48
End: 2023-10-31
Payer: MEDICARE

## 2023-10-31 DIAGNOSIS — E66.01 OBESITY, MORBID, BMI 40.0-49.9 (HCC): Primary | ICD-10-CM

## 2023-10-31 DIAGNOSIS — F54 PSYCHOLOGICAL AND BEHAVIORAL FACTORS ASSOCIATED WITH DISORDERS OR DISEASES CLASSIFIED ELSEWHERE: ICD-10-CM

## 2023-10-31 PROCEDURE — 90791 PSYCH DIAGNOSTIC EVALUATION: CPT | Performed by: SOCIAL WORKER

## 2023-10-31 PROCEDURE — 1036F TOBACCO NON-USER: CPT | Performed by: SOCIAL WORKER

## 2023-10-31 ASSESSMENT — PATIENT HEALTH QUESTIONNAIRE - PHQ9
1. LITTLE INTEREST OR PLEASURE IN DOING THINGS: 2
9. THOUGHTS THAT YOU WOULD BE BETTER OFF DEAD, OR OF HURTING YOURSELF: 0
SUM OF ALL RESPONSES TO PHQ QUESTIONS 1-9: 5
4. FEELING TIRED OR HAVING LITTLE ENERGY: 1
5. POOR APPETITE OR OVEREATING: 0
2. FEELING DOWN, DEPRESSED OR HOPELESS: 0
8. MOVING OR SPEAKING SO SLOWLY THAT OTHER PEOPLE COULD HAVE NOTICED. OR THE OPPOSITE, BEING SO FIGETY OR RESTLESS THAT YOU HAVE BEEN MOVING AROUND A LOT MORE THAN USUAL: 1
3. TROUBLE FALLING OR STAYING ASLEEP: 1
7. TROUBLE CONCENTRATING ON THINGS, SUCH AS READING THE NEWSPAPER OR WATCHING TELEVISION: 0
SUM OF ALL RESPONSES TO PHQ QUESTIONS 1-9: 5
6. FEELING BAD ABOUT YOURSELF - OR THAT YOU ARE A FAILURE OR HAVE LET YOURSELF OR YOUR FAMILY DOWN: 0
SUM OF ALL RESPONSES TO PHQ9 QUESTIONS 1 & 2: 2

## 2023-10-31 ASSESSMENT — ANXIETY QUESTIONNAIRES
1. FEELING NERVOUS, ANXIOUS, OR ON EDGE: 0
4. TROUBLE RELAXING: 0
6. BECOMING EASILY ANNOYED OR IRRITABLE: 0
5. BEING SO RESTLESS THAT IT IS HARD TO SIT STILL: 1
GAD7 TOTAL SCORE: 3
3. WORRYING TOO MUCH ABOUT DIFFERENT THINGS: 1
7. FEELING AFRAID AS IF SOMETHING AWFUL MIGHT HAPPEN: 1
2. NOT BEING ABLE TO STOP OR CONTROL WORRYING: 0

## 2023-10-31 ASSESSMENT — LIFESTYLE VARIABLES
HOW OFTEN DO YOU HAVE A DRINK CONTAINING ALCOHOL: MONTHLY OR LESS
HOW MANY STANDARD DRINKS CONTAINING ALCOHOL DO YOU HAVE ON A TYPICAL DAY: 1 OR 2

## 2023-10-31 NOTE — PROGRESS NOTES
CONFIDENTIAL PSYCHOLOGICAL REPORT  BARIATRIC SURGERY PSYCHOLOGICAL ASSESSMENT      Patient Pr-[address]    Date of Interview:10/31/2023    Date of Report:10/31/23    Patient Surgery Status:     [x]Approved  []Pended  []Denied    Risk Factors  [x]Mild risk factors  []Mild-moderate risk factors/recommendations given  []Moderate risk factors/recommendations given  []High Risk-significant risk factors/contingencies given  []High Risk- significant risk factors that contraindicate surgery    Chief Complaint: Obesity    Weight: 236 lbs  BMI:  45.33    Gender: female    Medical Record #:[id number]    YOB: 1975    Current Age:48 y.o. Surgeon: Damián    Location of Evaluation: [facility]    Duration: 60 mins    : Tru Pringle. Clarisa Holstein, LISW-CP    Interview Conducted: _XX___ In Office  ____ Virtually( My Chart)  SW and pt were located in the state [facility]. Client was identified by full name before interview began. Informed consent was discussed and obtained. Details regarding the limits of confidentiality, expectations for psychological procedures and services, provider credentials, and client rights and [place] discussed with this individual. Details related to duty to warn were made explicit and client voiced understanding. Patient had capacity to provide full consent, was allowed to ask questions, expressed understanding of the information presented and voluntarily gave consent for evaluation and treatment. From a psychological perspective, this patient has been designated as the following as a candidate for bariatric surgery at the time of this interview:  []Poor  []Marginal []Fair   []Sufficient  []Good [x]Very Good   []Excellent    Summary of Findings: Pt is a 50y.o. year old female,  who presented for psychological evaluation for bariatric surgery.   In review of the data and the information obtained from this assessment, there do not appear to be any

## 2023-11-16 ENCOUNTER — OFFICE VISIT (OUTPATIENT)
Dept: GASTROENTEROLOGY | Age: 48
End: 2023-11-16

## 2023-11-16 VITALS
OXYGEN SATURATION: 96 % | SYSTOLIC BLOOD PRESSURE: 144 MMHG | HEART RATE: 88 BPM | HEIGHT: 60 IN | BODY MASS INDEX: 45.94 KG/M2 | DIASTOLIC BLOOD PRESSURE: 92 MMHG | WEIGHT: 234 LBS | TEMPERATURE: 97.7 F

## 2023-11-16 DIAGNOSIS — R93.3 ABNORMAL UGI SERIES: Primary | ICD-10-CM

## 2023-11-16 DIAGNOSIS — Z80.0 FAMILY HX OF COLON CANCER REQUIRING SCREENING COLONOSCOPY: ICD-10-CM

## 2023-11-16 RX ORDER — OMEPRAZOLE 40 MG/1
CAPSULE, DELAYED RELEASE ORAL
COMMUNITY
Start: 2023-10-24

## 2023-11-16 RX ORDER — CETIRIZINE HYDROCHLORIDE 10 MG/1
TABLET ORAL
COMMUNITY
Start: 2023-10-24

## 2023-11-16 NOTE — PROGRESS NOTES
Allyson Aranda (:  1975) is a 50 y.o. female new patient referred to our office for evaluation of the following chief complaint(s):  Gastroesophageal Reflux           ASSESSMENT/PLAN:  1. Abnormal UGI series  2. Family hx of colon cancer requiring screening colonoscopy       Schedule EGD and colonoscopy. Subjective   SUBJECTIVE/OBJECTIVE  Allyson Aranda is a 50y.o. year old female with PMH is pertinent for HTN, HPL, MO with BMI 45.70, depression, anxiety, chronic back pain, arthritis. Surgical history is pertinent for  x2, uterine ablation, spinal cord stimulator implanted and removed, 2 ligation, partial hysterectomy. Patient was referred to our office for evaluation of GERD. Referral notes reviewed from 10/1 for. Pertinent evaluation to-date includes:     -UGI 10/24/23: mild esophageal dysmotility, barium tablet unable to traverse the GE junction, mild esophageal reflux, possible small traction diverticulum along the mid-thoracic esophagus    Today patient reports she saw ENT 3-4 years ago for hoarseness and was told she had silent acid reflux. She was put on Omeprazole 40 mg QD with improvement in symptoms. Denies nausea, vomiting, abdominal pain, dysphagia, odynophagia. She reports infrequent mild indigestion symptoms. She had UGI done as part of evaluation for gastric sleeve surgery, being followed by Dr. ANGELA [facility]. Denies bowel habit changes, diarrhea, constipation, melena, hematochezia. Her mom was recently diagnosed with colon cancer, diagnosed at 79; she is having surgery next month. Her dad was diagnosed with colon cancer at 48. Denies prior EGD or colonoscopy evaluation. Denies hx tobacco. Drinks alcohol infrequently. Denies [address] She takes Ibuprofen 1 tablet occasionally for chronic back pain also managed with Norco 10/325 mg BID.      Past Medical History:   Diagnosis Date    Anxiety     AR (allergic rhinitis)     Arthritis     back    Asthma     rescue

## 2023-11-17 ENCOUNTER — PREP FOR PROCEDURE (OUTPATIENT)
Dept: GASTROENTEROLOGY | Age: 48
End: 2023-11-17

## 2023-11-17 DIAGNOSIS — R93.3 ABNORMAL UGI SERIES: ICD-10-CM

## 2023-11-17 DIAGNOSIS — Z80.0 FAMILY HX OF COLORECTAL CANCER: ICD-10-CM

## 2023-11-17 RX ORDER — SODIUM CHLORIDE 0.9 % (FLUSH) 0.9 %
5-40 SYRINGE (ML) INJECTION PRN
Status: CANCELLED | OUTPATIENT
Start: 2023-11-17

## 2023-11-17 RX ORDER — SODIUM CHLORIDE 9 MG/ML
25 INJECTION, SOLUTION INTRAVENOUS PRN
Status: CANCELLED | OUTPATIENT
Start: 2023-11-17

## 2023-11-17 RX ORDER — SODIUM CHLORIDE 0.9 % (FLUSH) 0.9 %
5-40 SYRINGE (ML) INJECTION EVERY 12 HOURS SCHEDULED
Status: CANCELLED | OUTPATIENT
Start: 2023-11-17

## 2023-11-20 ENCOUNTER — TELEPHONE (OUTPATIENT)
Dept: GASTROENTEROLOGY | Age: 48
End: 2023-11-20

## 2023-12-04 ENCOUNTER — TELEPHONE (OUTPATIENT)
Dept: GASTROENTEROLOGY | Age: 48
End: 2023-12-04

## 2023-12-04 NOTE — TELEPHONE ENCOUNTER
Patient called and left message to cancel case. Called LVM for patient to call and reschedule. Thanks.

## 2023-12-07 ENCOUNTER — PREP FOR PROCEDURE (OUTPATIENT)
Dept: SURGERY | Age: 48
End: 2023-12-07

## 2023-12-11 NOTE — PROGRESS NOTES
during the procedure and is considered medically necessary to rule out concerns with leak, bleeding, and/or obstruction. 2.  Patient will complete our 2 week pre operative diet. 3.  Bring incentive spirometer (given with our standard instruction to use BID 2 weeks prior to surgery) to hospital on day of surgery. 4.  The patient received prescription to use after surgery for: Percocet, Zofran, and Prilosec. 5.  Based on physical assessments and evaluations, this patient is medically cleared for surgery. I went over the risks and benefits with the patient. For risks I went over problems with bleeding, infection and anesthesia. I also noted potential problems of injury to the esophagus, liver, spleen, stomach, pancreas, small bowel and or colon. I addition, I discussed potential problems of DVT/pulmonary embolism, urinary tract infection, wound infection, myocardial infarction, stroke and the potential need to convert to an open procedure. I quoted a 1% nationwide mortality rate for this procedure. She has signed our extensive consent form which is in the chart.        Shirley Hernandez MD  Date:  12/12/2023

## 2023-12-12 ENCOUNTER — HOSPITAL ENCOUNTER (OUTPATIENT)
Dept: SURGERY | Age: 48
Discharge: HOME | End: 2023-12-15
Payer: COMMERCIAL

## 2023-12-12 ENCOUNTER — OFFICE VISIT (OUTPATIENT)
Dept: SURGERY | Age: 48
End: 2023-12-12
Payer: MEDICARE

## 2023-12-12 VITALS
DIASTOLIC BLOOD PRESSURE: 103 MMHG | HEIGHT: 60 IN | SYSTOLIC BLOOD PRESSURE: 144 MMHG | HEART RATE: 96 BPM | WEIGHT: 236 LBS | BODY MASS INDEX: 46.33 KG/M2

## 2023-12-12 VITALS
HEART RATE: 93 BPM | DIASTOLIC BLOOD PRESSURE: 80 MMHG | HEIGHT: 60 IN | WEIGHT: 236.99 LBS | BODY MASS INDEX: 46.53 KG/M2 | RESPIRATION RATE: 16 BRPM | SYSTOLIC BLOOD PRESSURE: 128 MMHG | TEMPERATURE: 97.3 F

## 2023-12-12 DIAGNOSIS — R11.2 POST-OPERATIVE NAUSEA AND VOMITING: ICD-10-CM

## 2023-12-12 DIAGNOSIS — E66.01 OBESITY, CLASS III, BMI 40-49.9 (MORBID OBESITY) (HCC): ICD-10-CM

## 2023-12-12 DIAGNOSIS — Z98.890 POST-OPERATIVE NAUSEA AND VOMITING: ICD-10-CM

## 2023-12-12 DIAGNOSIS — G89.18 POST-OPERATIVE PAIN: ICD-10-CM

## 2023-12-12 DIAGNOSIS — E66.01 MORBID OBESITY (HCC): Primary | ICD-10-CM

## 2023-12-12 DIAGNOSIS — R13.14 PHARYNGOESOPHAGEAL DYSPHAGIA: ICD-10-CM

## 2023-12-12 DIAGNOSIS — K21.9 GASTROESOPHAGEAL REFLUX DISEASE WITHOUT ESOPHAGITIS: ICD-10-CM

## 2023-12-12 DIAGNOSIS — I10 PRIMARY HYPERTENSION: ICD-10-CM

## 2023-12-12 LAB
CREAT SERPL-MCNC: 0.92 MG/DL (ref 0.6–1)
HGB BLD-MCNC: 14.3 G/DL (ref 11.7–15.4)
POTASSIUM SERPL-SCNC: 3.8 MMOL/L (ref 3.5–5.1)

## 2023-12-12 PROCEDURE — 82565 ASSAY OF CREATININE: CPT

## 2023-12-12 PROCEDURE — 3080F DIAST BP >= 90 MM HG: CPT | Performed by: SURGERY

## 2023-12-12 PROCEDURE — G8484 FLU IMMUNIZE NO ADMIN: HCPCS | Performed by: SURGERY

## 2023-12-12 PROCEDURE — G8417 CALC BMI ABV UP PARAM F/U: HCPCS | Performed by: SURGERY

## 2023-12-12 PROCEDURE — G8427 DOCREV CUR MEDS BY ELIG CLIN: HCPCS | Performed by: SURGERY

## 2023-12-12 PROCEDURE — 3077F SYST BP >= 140 MM HG: CPT | Performed by: SURGERY

## 2023-12-12 PROCEDURE — 99213 OFFICE O/P EST LOW 20 MIN: CPT | Performed by: SURGERY

## 2023-12-12 PROCEDURE — 85018 HEMOGLOBIN: CPT

## 2023-12-12 PROCEDURE — 84132 ASSAY OF SERUM POTASSIUM: CPT

## 2023-12-12 PROCEDURE — 1036F TOBACCO NON-USER: CPT | Performed by: SURGERY

## 2023-12-12 RX ORDER — OXYCODONE HYDROCHLORIDE AND ACETAMINOPHEN 5; 325 MG/1; MG/1
1 TABLET ORAL EVERY 6 HOURS PRN
Qty: 20 TABLET | Refills: 0 | Status: SHIPPED | OUTPATIENT
Start: 2023-12-12 | End: 2023-12-17

## 2023-12-12 RX ORDER — OMEPRAZOLE 40 MG/1
40 CAPSULE, DELAYED RELEASE ORAL
Qty: 90 CAPSULE | Refills: 0 | Status: SHIPPED | OUTPATIENT
Start: 2023-12-12

## 2023-12-12 RX ORDER — ONDANSETRON 4 MG/1
4 TABLET, FILM COATED ORAL 3 TIMES DAILY PRN
Qty: 30 TABLET | Refills: 0 | Status: SHIPPED | OUTPATIENT
Start: 2023-12-12

## 2023-12-12 NOTE — PERIOP NOTE
PLEASE CONTINUE TAKING ALL PRESCRIPTION MEDICATIONS UP TO THE DAY OF SURGERY UNLESS OTHERWISE DIRECTED BELOW. You may take Tylenol, allergy,  and/or indigestion medications. TAKE ONLY THESE MEDICATIONS ON THE DAY OF SURGERY   Amlodipine  Azelastine-Fluticasone nasal sprays bring to hospital   Belbuca  Hydrocodone        DISCONTINUE all vitamins and supplements 7 days prior to surgery. DISCONTINUE Non-Steroidal Anti-Inflammatory (NSAIDS) such as Advil and Aleve 5 days prior to surgery. Home Medications to Hold- please continue all other medications except these. Hold Phentermine x 5  days prior to surgery        Comments      On the day before surgery please take 2 Tylenol in the morning and then again before bed. You may use either regular or extra strength. Please do not bring home medications with you on the day of surgery unless otherwise directed by your nurse. If you are instructed to bring home medications, please give them to your nurse as they will be administered by the nursing staff. If you have any questions, please call [address] [place] [address] [place] [phone]. A copy of this note was provided to the patient for reference.

## 2023-12-12 NOTE — PERIOP NOTE
Patient verified name and     Order for consent not found in EHR      Type 2 surgery, PAT walk in assessment complete. Labs per surgeon: None found in EHR  Labs per anesthesia protocol: Hgb, K+ and Cr; results pending  EKG: Not required per anesthesia guidelines    MRSA/MSSA swab collected; pharmacy to review and dose antibiotic as appropriate. Hospital approved surgical skin cleanser and instructions given per hospital policy. Patient provided with and instructed on educational handouts including Guide to Surgery, Pain Management, Hand Hygiene, Blood Transfusion Education, and [place] [facility] Brochure. Patient answered medical/surgical history questions at their best of ability. All prior to admission medications documented in [facility]. Original medication prescription bottle not visualized during patient appointment. Patient instructed to hold all vitamins 7 days prior to surgery and NSAIDS 5 days prior to surgery, patient verbalized understanding. Patient teach back successful and patient demonstrates knowledge of instructions.

## 2023-12-12 NOTE — PERIOP NOTE
Patient verified name and     Order for consent not found in EHR a. Type 3 surgery, PAT walk in assessment complete. Labs per surgeon:; results None  Labs per anesthesia protocol: Hbg, K+ and Cr; results pending  EKG:not required Not required per anesthesia guidelines         Hospital approved surgical skin cleanser and instructions given per hospital policy. Patient provided with and instructed on educational handouts including Guide to Surgery, Pain Management, Hand Hygiene, Blood Transfusion Education, and [place] [facility] Brochure. Patient answered medical/surgical history questions at their best of ability. All prior to admission medications documented in [facility]. Original medication prescription bottle janette visualized during patient appointment. Patient instructed to hold all vitamins 7 days prior to surgery and NSAIDS 5 days prior to surgery, patient verbalized understanding. Patient teach back successful and patient demonstrates knowledge of instructions.

## 2023-12-12 NOTE — PERIOP NOTE
SURGICAL WEIGHT LOSS Enhanced Recovery After Surgery: diabetic and non-diabetic patients      The evening before surgery 12/25/2023, drink 1 bottles of the Ensure Pre-Surgery drink. Please do not eat or drink after midnight the night before your surgery. Bring your patient handbook with you to the hospital.        Things to Remember:      1. You will be up in a chair the evening of surgery and sipping on clear liquids, once released by your surgeon. 2. Beginning the day of surgery, you will be out of the bed as able, once released by your hospital team. We encourage you to be sitting up in a chair, walking in the gonzales, doing exercises as advised by physical therapy, etc.       3. You will be given scheduled non-narcotic pain medication to help keep your pain under control. You will have stronger pain medication ordered for break through pain. 4. All of these measures are geared toward improving your overall surgical recovery and   decreasing the risk of complications.

## 2023-12-14 RX ORDER — PROCHLORPERAZINE EDISYLATE 5 MG/ML
10 INJECTION INTRAMUSCULAR; INTRAVENOUS EVERY 6 HOURS PRN
Status: CANCELLED | OUTPATIENT
Start: 2023-12-14

## 2023-12-14 RX ORDER — DIPHENHYDRAMINE HCL 25 MG
25 CAPSULE ORAL EVERY 6 HOURS PRN
Status: CANCELLED | OUTPATIENT
Start: 2023-12-14

## 2023-12-14 RX ORDER — SODIUM CHLORIDE 9 MG/ML
INJECTION, SOLUTION INTRAVENOUS PRN
Status: CANCELLED | OUTPATIENT
Start: 2023-12-14

## 2023-12-14 RX ORDER — HYDRALAZINE HYDROCHLORIDE 20 MG/ML
10 INJECTION INTRAMUSCULAR; INTRAVENOUS EVERY 6 HOURS PRN
Status: CANCELLED | OUTPATIENT
Start: 2023-12-14

## 2023-12-14 RX ORDER — ONDANSETRON 2 MG/ML
4 INJECTION INTRAMUSCULAR; INTRAVENOUS EVERY 6 HOURS PRN
Status: CANCELLED | OUTPATIENT
Start: 2023-12-14

## 2023-12-14 RX ORDER — SODIUM CHLORIDE 0.9 % (FLUSH) 0.9 %
5-40 SYRINGE (ML) INJECTION PRN
Status: CANCELLED | OUTPATIENT
Start: 2023-12-14

## 2023-12-14 RX ORDER — HYDROMORPHONE HYDROCHLORIDE 2 MG/ML
0.5 INJECTION, SOLUTION INTRAMUSCULAR; INTRAVENOUS; SUBCUTANEOUS
Status: CANCELLED | OUTPATIENT
Start: 2023-12-14

## 2023-12-14 RX ORDER — SIMETHICONE 80 MG
80 TABLET,CHEWABLE ORAL EVERY 6 HOURS PRN
Status: CANCELLED | OUTPATIENT
Start: 2023-12-14

## 2023-12-14 RX ORDER — SUCRALFATE 1 G/1
1 TABLET ORAL EVERY 6 HOURS SCHEDULED
Status: CANCELLED | OUTPATIENT
Start: 2023-12-14

## 2023-12-14 RX ORDER — DIPHENHYDRAMINE HYDROCHLORIDE 50 MG/ML
25 INJECTION INTRAMUSCULAR; INTRAVENOUS EVERY 6 HOURS PRN
Status: CANCELLED | OUTPATIENT
Start: 2023-12-14

## 2023-12-14 RX ORDER — LIDOCAINE HYDROCHLORIDE ANHYDROUS AND DEXTROSE MONOHYDRATE 5; 400 G/100ML; MG/100ML
1 INJECTION, SOLUTION INTRAVENOUS CONTINUOUS
Status: CANCELLED | OUTPATIENT
Start: 2023-12-14 | End: 2023-12-15

## 2023-12-14 RX ORDER — ACETAMINOPHEN 325 MG/1
650 TABLET ORAL EVERY 6 HOURS
Status: CANCELLED | OUTPATIENT
Start: 2023-12-14

## 2023-12-14 RX ORDER — OXYCODONE HYDROCHLORIDE 5 MG/1
5 TABLET ORAL EVERY 4 HOURS PRN
Status: CANCELLED | OUTPATIENT
Start: 2023-12-14

## 2023-12-14 RX ORDER — ENOXAPARIN SODIUM 100 MG/ML
40 INJECTION SUBCUTANEOUS EVERY 12 HOURS SCHEDULED
Status: CANCELLED | OUTPATIENT
Start: 2023-12-14

## 2023-12-14 RX ORDER — GABAPENTIN 300 MG/1
300 CAPSULE ORAL 3 TIMES DAILY
Status: CANCELLED | OUTPATIENT
Start: 2023-12-14

## 2023-12-14 RX ORDER — SODIUM CHLORIDE AND POTASSIUM CHLORIDE 150; 900 MG/100ML; MG/100ML
INJECTION, SOLUTION INTRAVENOUS CONTINUOUS
Status: CANCELLED | OUTPATIENT
Start: 2023-12-14

## 2023-12-14 RX ORDER — SODIUM CHLORIDE 0.9 % (FLUSH) 0.9 %
5-40 SYRINGE (ML) INJECTION EVERY 12 HOURS SCHEDULED
Status: CANCELLED | OUTPATIENT
Start: 2023-12-14

## 2023-12-14 RX ORDER — KETOROLAC TROMETHAMINE 30 MG/ML
30 INJECTION, SOLUTION INTRAMUSCULAR; INTRAVENOUS EVERY 6 HOURS
Status: CANCELLED | OUTPATIENT
Start: 2023-12-14 | End: 2023-12-15

## 2023-12-20 NOTE — H&P
Neftali Steel MD                                  [address] [place] [address], [place] [address]                                  Phone [phone], Fax [phone]    History and Physical    Patient: Jenna Lopez MRN: [id number]  SSN: [id number]    YOB: 1975  Age: 48 y.o.  Sex: female              PCP: Osman Anand Jr., MD   Date:  12/20/2023    Consult Date: 10/02/2023    Bariatric Procedure of Interest: laparoscopic sleeve gastrectomy    Jenna Lopez returns to the [facility] after successful completion of our multidisciplinary surgical prep program.  This is her final consultation preparing her for sleeve gastrectomy surgery.  She presents with a height of 1.524 m (5') and weight of 107 kg (236 lb), giving her a There is no height or weight on file to calculate BMI.   She has an ideal body weight of 130 lbs, and excess body weight of 106 lbs.   She started our program with a weight of 238 lbs, losing 2 lbs.    30-day Bariatric Surgical Risk Percentage: 4.98%    She has completed all aspects of our prep program and has been deemed an acceptable candidate for bariatric surgery.    PSYCHOLOGICAL EVALUATION:   Completed, 10/31/2023 with Roberta Slade deeming her an appropriate surgical candidate.    DIETITIAN EVALUATION:  Completed deeming her an appropriate surgical candidate.    BARIATRIC LABS:  Completed, 10/02/2023 (A1C 6.2, Vitamin D 27.5)    PCP CLEARANCE:  Completed, 10/30/2023    PAIN MANAGEMENT CLEARANCE:  Completed, 10/26/2023    UPPER GI:  Completed, 10/23/2023  IMPRESSION:  -Mild esophageal dysmotility with mildly delayed passage of contrast through the GE junction.  -The barium tablet was unable to traverse the GE junction, suggestive of luminal narrowing.  -Mild gastroesophageal reflux.  -Probable small traction diverticulum along the mid thoracic esophagus.    EGD:  Completed,  12/06/2023  Normal esophageal mucosa. Dilation to 46 F completed. No other abnormalities. PHYSICAL THERAPY EVALUATION FOR MOBILITY OPTIMIZATION:  Completed, 10/20/2023    We have reviewed the procedure again today and completed our standard pre op teaching. Her questions were answered and she is ready to schedule surgery. We have discussed the procedure, diet and exercise regimens, and potential complications of surgery. The patient understands the nature and potential complication of surgery and has the capacity to follow the post operative diet, exercise, and nutritional requirements. After review, she has signed her surgical consent today. MEDICAL HISTORY:  Morbid Obesity  Depression  Anxiety  Chronic back pain - hx of stimulator placement with removal  On chronic pain medication - Hydrocodone  Hx of COVID-19  Hx of MRSA     Comorbidity Yes or No   Obstructive Sleep Apnea No   Diabetes Mellitus No   Hypertension-  Number of medications= 3 Yes   Gastroesophageal Reflux  Treatment Med= Yes   Hyperlipidemia with medication No   Coronary Artery Disease No   Cancer No   Asthma Yes   Osteoarthritis Yes   Joint Pain Yes         Other Yes or No   Venous Stasis No   Dialysis No   Long term use of steroids or immunocompromised conditions No   On Anticoagulants No         PRIOR WEIGHT LOSS ATTEMPTS:  Reports more than 15 previous weight loss attempts including medication (Phentermine) and self-supervised diet with exercise. EXERCISE ASSESSMENT:  Reports current physical activity regimen including walking daily. DENTAL ISSUES:  No dental issues with chewing. PSYCHOSOCIAL:  She notes she is  and states main support person is her , Estefany Ingram. She is not employed due to chronic back pain. Her goal in pursuing surgical weight loss is to improve health. She has a history of depression and anxiety. Denies history of mental health disorders.   She states she somewhat is independent

## 2023-12-20 NOTE — H&P (VIEW-ONLY)
Dahiana Rizzo MD                                  [address]                                  [address] [place] [address]                                  Phone [phone], Fax [phone]    History and Physical    Patient: Aracely Rubio MRN: [id number]  SSN: [id number]    YOB: 1975  Age: 50 y.o. Sex: female              PCP: Erica Rodriguez MD   Date:  12/20/2023    Consult Date: 10/02/2023    Bariatric Procedure of Interest: laparoscopic sleeve gastrectomy    [facility]acely Rubio [facility] to the [facility] after successful completion of our multidisciplinary surgical prep program.  This is her final consultation preparing her for sleeve gastrectomy surgery. She presents with a height of 1.524 m (5') and weight of 107 kg (236 lb), giving her a There is no height or weight on file to calculate BMI. She has an ideal body weight of 130 lbs, and excess body weight of 106 lbs. She started our program with a weight of 238 lbs, losing 2 lbs. 30-day Bariatric Surgical Risk Percentage: 4.98%    She has completed all aspects of our prep program and has been deemed an acceptable candidate for bariatric surgery. PSYCHOLOGICAL EVALUATION:   Completed, 10/31/2023 with Za Gallegos deeming her an appropriate surgical candidate. DIETITIAN EVALUATION:  Completed deeming her an appropriate surgical candidate. BARIATRIC LABS:  Completed, 10/02/2023 (A1C 6.2, Vitamin D 27.5)    PCP CLEARANCE:  Completed, 10/30/2023    PAIN MANAGEMENT CLEARANCE:  Completed, 10/26/2023    UPPER GI:  Completed, 10/23/2023  IMPRESSION:  -Mild esophageal dysmotility with mildly delayed passage of contrast through the GE junction.  -The barium tablet was unable to traverse the GE junction, suggestive of luminal narrowing.  -Mild gastroesophageal reflux.  -Probable small traction diverticulum along the mid thoracic esophagus.     EGD:  Completed,

## 2023-12-26 ENCOUNTER — ANESTHESIA (OUTPATIENT)
Dept: SURGERY | Age: 48
DRG: 641 | End: 2023-12-26
Payer: COMMERCIAL

## 2023-12-26 ENCOUNTER — ANESTHESIA EVENT (OUTPATIENT)
Dept: SURGERY | Age: 48
DRG: 621 | End: 2023-12-26
Payer: MEDICARE

## 2023-12-26 ENCOUNTER — ANESTHESIA EVENT (OUTPATIENT)
Dept: SURGERY | Age: 48
DRG: 641 | End: 2023-12-26
Payer: COMMERCIAL

## 2023-12-26 ENCOUNTER — HOSPITAL ENCOUNTER (INPATIENT)
Age: 48
LOS: 1 days | Discharge: HOME | DRG: 621 | End: 2023-12-26
Attending: SURGERY | Admitting: SURGERY
Payer: MEDICARE

## 2023-12-26 VITALS
SYSTOLIC BLOOD PRESSURE: 129 MMHG | DIASTOLIC BLOOD PRESSURE: 96 MMHG | TEMPERATURE: 97.5 F | HEIGHT: 60 IN | HEART RATE: 87 BPM | BODY MASS INDEX: 45.45 KG/M2 | OXYGEN SATURATION: 98 % | WEIGHT: 231.5 LBS | RESPIRATION RATE: 16 BRPM

## 2023-12-26 LAB
ABO + RH BLD: NORMAL
BLOOD GROUP ANTIBODIES SERPL: NORMAL
SPECIMEN EXP DATE BLD: NORMAL

## 2023-12-26 PROCEDURE — 2580000003 HC RX 258: Performed by: ANESTHESIOLOGY

## 2023-12-26 PROCEDURE — 86900 BLOOD TYPING SEROLOGIC ABO: CPT

## 2023-12-26 PROCEDURE — 86850 RBC ANTIBODY SCREEN: CPT

## 2023-12-26 PROCEDURE — 6360000002 HC RX W HCPCS: Performed by: PHYSICIAN ASSISTANT

## 2023-12-26 PROCEDURE — 6370000000 HC RX 637 (ALT 250 FOR IP): Performed by: PHYSICIAN ASSISTANT

## 2023-12-26 PROCEDURE — 2580000003 HC RX 258: Performed by: PHYSICIAN ASSISTANT

## 2023-12-26 PROCEDURE — 6370000000 HC RX 637 (ALT 250 FOR IP): Performed by: ANESTHESIOLOGY

## 2023-12-26 PROCEDURE — 86901 BLOOD TYPING SEROLOGIC RH(D): CPT

## 2023-12-26 RX ORDER — LIDOCAINE HYDROCHLORIDE 10 MG/ML
1 INJECTION, SOLUTION INFILTRATION; PERINEURAL
Status: DISCONTINUED | OUTPATIENT
Start: 2023-12-26 | End: 2023-12-26 | Stop reason: HOSPADM

## 2023-12-26 RX ORDER — SODIUM CHLORIDE, SODIUM LACTATE, POTASSIUM CHLORIDE, CALCIUM CHLORIDE 600; 310; 30; 20 MG/100ML; MG/100ML; MG/100ML; MG/100ML
INJECTION, SOLUTION INTRAVENOUS CONTINUOUS
Status: CANCELLED | OUTPATIENT
Start: 2023-12-26

## 2023-12-26 RX ORDER — HYDROMORPHONE HYDROCHLORIDE 1 MG/ML
0.25 INJECTION, SOLUTION INTRAMUSCULAR; INTRAVENOUS; SUBCUTANEOUS EVERY 5 MIN PRN
Status: CANCELLED | OUTPATIENT
Start: 2023-12-26

## 2023-12-26 RX ORDER — SODIUM CHLORIDE 9 MG/ML
INJECTION, SOLUTION INTRAVENOUS CONTINUOUS
Status: DISCONTINUED | OUTPATIENT
Start: 2023-12-26 | End: 2023-12-26 | Stop reason: HOSPADM

## 2023-12-26 RX ORDER — DIPHENHYDRAMINE HYDROCHLORIDE 50 MG/ML
12.5 INJECTION INTRAMUSCULAR; INTRAVENOUS
Status: CANCELLED | OUTPATIENT
Start: 2023-12-26 | End: 2023-12-27

## 2023-12-26 RX ORDER — SODIUM CHLORIDE 9 MG/ML
INJECTION, SOLUTION INTRAVENOUS PRN
Status: DISCONTINUED | OUTPATIENT
Start: 2023-12-26 | End: 2023-12-26 | Stop reason: HOSPADM

## 2023-12-26 RX ORDER — APREPITANT 40 MG/1
40 CAPSULE ORAL ONCE
Status: COMPLETED | OUTPATIENT
Start: 2023-12-26 | End: 2023-12-26

## 2023-12-26 RX ORDER — FENTANYL CITRATE 50 UG/ML
100 INJECTION, SOLUTION INTRAMUSCULAR; INTRAVENOUS
Status: DISCONTINUED | OUTPATIENT
Start: 2023-12-26 | End: 2023-12-26 | Stop reason: HOSPADM

## 2023-12-26 RX ORDER — SODIUM CHLORIDE, SODIUM LACTATE, POTASSIUM CHLORIDE, CALCIUM CHLORIDE 600; 310; 30; 20 MG/100ML; MG/100ML; MG/100ML; MG/100ML
INJECTION, SOLUTION INTRAVENOUS CONTINUOUS
Status: DISCONTINUED | OUTPATIENT
Start: 2023-12-26 | End: 2023-12-26 | Stop reason: HOSPADM

## 2023-12-26 RX ORDER — SODIUM CHLORIDE 0.9 % (FLUSH) 0.9 %
5-40 SYRINGE (ML) INJECTION EVERY 12 HOURS SCHEDULED
Status: DISCONTINUED | OUTPATIENT
Start: 2023-12-26 | End: 2023-12-26 | Stop reason: HOSPADM

## 2023-12-26 RX ORDER — SODIUM CHLORIDE 0.9 % (FLUSH) 0.9 %
5-40 SYRINGE (ML) INJECTION PRN
Status: DISCONTINUED | OUTPATIENT
Start: 2023-12-26 | End: 2023-12-26 | Stop reason: HOSPADM

## 2023-12-26 RX ORDER — ACETAMINOPHEN 500 MG
1000 TABLET ORAL ONCE
Status: COMPLETED | OUTPATIENT
Start: 2023-12-26 | End: 2023-12-26

## 2023-12-26 RX ORDER — SCOLOPAMINE TRANSDERMAL SYSTEM 1 MG/1
1 PATCH, EXTENDED RELEASE TRANSDERMAL
Status: DISCONTINUED | OUTPATIENT
Start: 2023-12-26 | End: 2023-12-26 | Stop reason: HOSPADM

## 2023-12-26 RX ORDER — ONDANSETRON 2 MG/ML
4 INJECTION INTRAMUSCULAR; INTRAVENOUS ONCE
Status: DISCONTINUED | OUTPATIENT
Start: 2023-12-26 | End: 2023-12-26 | Stop reason: HOSPADM

## 2023-12-26 RX ORDER — METOCLOPRAMIDE HYDROCHLORIDE 5 MG/ML
10 INJECTION INTRAMUSCULAR; INTRAVENOUS ONCE
Status: COMPLETED | OUTPATIENT
Start: 2023-12-26 | End: 2023-12-26

## 2023-12-26 RX ORDER — OXYCODONE HYDROCHLORIDE 5 MG/1
5 TABLET ORAL PRN
Status: CANCELLED | OUTPATIENT
Start: 2023-12-26 | End: 2023-12-26

## 2023-12-26 RX ORDER — MIDAZOLAM HYDROCHLORIDE 2 MG/2ML
2 INJECTION, SOLUTION INTRAMUSCULAR; INTRAVENOUS
Status: DISCONTINUED | OUTPATIENT
Start: 2023-12-26 | End: 2023-12-26 | Stop reason: HOSPADM

## 2023-12-26 RX ORDER — HYDROMORPHONE HYDROCHLORIDE 1 MG/ML
0.5 INJECTION, SOLUTION INTRAMUSCULAR; INTRAVENOUS; SUBCUTANEOUS EVERY 10 MIN PRN
Status: CANCELLED | OUTPATIENT
Start: 2023-12-26

## 2023-12-26 RX ORDER — FAMOTIDINE 20 MG/1
20 TABLET, FILM COATED ORAL ONCE
Status: COMPLETED | OUTPATIENT
Start: 2023-12-26 | End: 2023-12-26

## 2023-12-26 RX ORDER — ONDANSETRON 2 MG/ML
4 INJECTION INTRAMUSCULAR; INTRAVENOUS
Status: CANCELLED | OUTPATIENT
Start: 2023-12-26 | End: 2023-12-27

## 2023-12-26 RX ORDER — OXYCODONE HYDROCHLORIDE 5 MG/1
10 TABLET ORAL PRN
Status: CANCELLED | OUTPATIENT
Start: 2023-12-26 | End: 2023-12-26

## 2023-12-26 RX ADMIN — SODIUM CHLORIDE, POTASSIUM CHLORIDE, SODIUM LACTATE AND CALCIUM CHLORIDE: 600; 310; 30; 20 INJECTION, SOLUTION INTRAVENOUS at 13:29

## 2023-12-26 RX ADMIN — APREPITANT 40 MG: 40 CAPSULE ORAL at 13:30

## 2023-12-26 RX ADMIN — ACETAMINOPHEN 1000 MG: 500 TABLET, FILM COATED ORAL at 13:30

## 2023-12-26 RX ADMIN — FAMOTIDINE 20 MG: 20 TABLET, FILM COATED ORAL at 13:30

## 2023-12-26 RX ADMIN — METOCLOPRAMIDE HYDROCHLORIDE 10 MG: 5 INJECTION INTRAMUSCULAR; INTRAVENOUS at 13:53

## 2023-12-26 ASSESSMENT — PAIN - FUNCTIONAL ASSESSMENT: PAIN_FUNCTIONAL_ASSESSMENT: 0-10

## 2023-12-26 NOTE — PROGRESS NOTES
Surgery  The patient's procedure is cancelled for today. I had to return to the OR with my first sleeve patient who was bleeding. I had planned to do the procedure, but I am being bumped by a torsion of the testicle and a bleeding ectopic pregnancy. I would not be able to start until after 9 PM. I have already been in the hospital since 6:30 AM after being on call yesterday. I have arranged for the patient to have surgery at 10 AM on 12/27/23. I went over the situation with the patient and her . They nicely agreed to have the surgery switched to tomorrow.    [facility], MD.

## 2023-12-26 NOTE — PERIOP NOTE
Patient cancelled in pre op due to add on emergency cases. Dr. Anthony Christopher rescheduled patient for tomorrow at 1000.

## 2023-12-26 NOTE — ANESTHESIA PRE PROCEDURE
Department of Anesthesiology  Preprocedure Note       Name:  Dc Cannon   Age:  50 y.o.  :  1975                                          MRN:  [id number]         Date:  2023      Surgeon: Will Gee): Tiffany Hong MD    Procedure: Procedure(s):  ERAS/ GASTRECTOMY SLEEVE LAPAROSCOPIC    Medications prior to admission:   Prior to Admission medications    Medication Sig Start Date End Date Taking? Authorizing Provider   omeprazole (PRILOSEC) 40 MG delayed release capsule Take 1 capsule by mouth every morning (before breakfast) 23   Karma Asher PA   ondansetron (ZOFRAN) 4 MG tablet Take 1 tablet by mouth 3 times daily as needed for Nausea or Vomiting  Patient not taking: Reported on 2023   Karma Asher PA   cetirizine (ZYRTEC) 10 MG tablet TAKE 1 TABLET (10 MG TOTAL) BY MOUTH IN THE MORNING 10/24/23   Jazzy Schneider MD   BELBUCA 150 MCG FILM PLACE 1 FILM (150 MCG) INSIDE THE CHEEK EVERY 12 HOURS FOR BASELINE ANALGESIA. 23   Jazzy Schneider MD   hydroCHLOROthiazide (HYDRODIURIL) 25 MG tablet TAKE 1 TABLET (25 MG TOTAL) BY MOUTH IN THE MORNING 23   Jazzy Schneider MD   phentermine 37.5 MG capsule Take 1 capsule by mouth every morning. 23   Jazzy Schneider MD   HYDROcodone-acetaminophen (Lucrezia Guess)  MG per tablet 1 TABLET ORALLY EVERY 6 HOURS AS NEEDED FOR PAIN. MAX 4 TABS PER DAY.  USE LOWEST EFFECTIVE DOSE. 23   Jazzy Schneider MD   baclofen (LIORESAL) 10 MG tablet Take 1 tablet by mouth 3 times daily    Jazzy Schneider MD   Multiple Vitamin (MULTIVITAMIN PO) Take 1 tablet by mouth daily    Automatic Reconciliation, Ar   acetaminophen (TYLENOL) 500 MG tablet Take by mouth as needed    Automatic Reconciliation, Ar   amLODIPine (NORVASC) 10 MG tablet Take 1 tablet by mouth daily 10/29/18   Automatic Reconciliation, Ar   Azelastine-Fluticasone 137-50 MCG/ACT SUSP SPRAY [address] [place] [address]

## 2023-12-26 NOTE — INTERVAL H&P NOTE
Update History & Physical    The patient's History and Physical of 12/20/23 was reviewed with the patient and I examined the patient. There was no change. The surgical site was confirmed by the patient and me. Plan: The risks, benefits, expected outcome, and alternative to the recommended procedure have been discussed with the patient. Patient understands and wants to proceed with the procedure.      Electronically signed by Armand Kocher, MD on 12/26/2023 at 12:51 PM

## 2023-12-27 ENCOUNTER — HOSPITAL ENCOUNTER (INPATIENT)
Age: 48
LOS: 1 days | Discharge: HOME | DRG: 621 | End: 2023-12-28
Attending: SURGERY | Admitting: SURGERY
Payer: MEDICARE

## 2023-12-27 ENCOUNTER — ANESTHESIA (OUTPATIENT)
Dept: SURGERY | Age: 48
DRG: 621 | End: 2023-12-27
Payer: MEDICARE

## 2023-12-27 PROBLEM — E66.01 MORBID OBESITY WITH BMI OF 45.0-49.9, ADULT (HCC): Status: ACTIVE | Noted: 2023-12-27

## 2023-12-27 LAB
GLUCOSE BLD STRIP.AUTO-MCNC: 114 MG/DL (ref 65–100)
SERVICE CMNT-IMP: ABNORMAL

## 2023-12-27 PROCEDURE — 2580000003 HC RX 258: Performed by: PHYSICIAN ASSISTANT

## 2023-12-27 PROCEDURE — 1100000000 HC RM PRIVATE

## 2023-12-27 PROCEDURE — 6360000002 HC RX W HCPCS: Performed by: SURGERY

## 2023-12-27 PROCEDURE — 97161 PT EVAL LOW COMPLEX 20 MIN: CPT

## 2023-12-27 PROCEDURE — 3700000001 HC ADD 15 MINUTES (ANESTHESIA): Performed by: SURGERY

## 2023-12-27 PROCEDURE — 6370000000 HC RX 637 (ALT 250 FOR IP): Performed by: SURGERY

## 2023-12-27 PROCEDURE — 6370000000 HC RX 637 (ALT 250 FOR IP): Performed by: PHYSICIAN ASSISTANT

## 2023-12-27 PROCEDURE — 2580000003 HC RX 258: Performed by: STUDENT IN AN ORGANIZED HEALTH CARE EDUCATION/TRAINING PROGRAM

## 2023-12-27 PROCEDURE — 3600000015 HC SURGERY LEVEL 5 ADDTL 15MIN: Performed by: SURGERY

## 2023-12-27 PROCEDURE — 6360000002 HC RX W HCPCS: Performed by: NURSE ANESTHETIST, CERTIFIED REGISTERED

## 2023-12-27 PROCEDURE — 0DB64Z3 EXCISION OF STOMACH, PERCUTANEOUS ENDOSCOPIC APPROACH, VERTICAL: ICD-10-PCS | Performed by: SURGERY

## 2023-12-27 PROCEDURE — 6360000002 HC RX W HCPCS: Performed by: PHYSICIAN ASSISTANT

## 2023-12-27 PROCEDURE — C1713 ANCHOR/SCREW BN/BN,TIS/BN: HCPCS | Performed by: SURGERY

## 2023-12-27 PROCEDURE — 7100000001 HC PACU RECOVERY - ADDTL 15 MIN: Performed by: SURGERY

## 2023-12-27 PROCEDURE — 3700000000 HC ANESTHESIA ATTENDED CARE: Performed by: SURGERY

## 2023-12-27 PROCEDURE — 2500000003 HC RX 250 WO HCPCS: Performed by: NURSE ANESTHETIST, CERTIFIED REGISTERED

## 2023-12-27 PROCEDURE — 88312 SPECIAL STAINS GROUP 1: CPT

## 2023-12-27 PROCEDURE — 6360000002 HC RX W HCPCS: Performed by: STUDENT IN AN ORGANIZED HEALTH CARE EDUCATION/TRAINING PROGRAM

## 2023-12-27 PROCEDURE — 88307 TISSUE EXAM BY PATHOLOGIST: CPT

## 2023-12-27 PROCEDURE — 3600000005 HC SURGERY LEVEL 5 BASE: Performed by: SURGERY

## 2023-12-27 PROCEDURE — 82962 GLUCOSE BLOOD TEST: CPT

## 2023-12-27 PROCEDURE — 7100000000 HC PACU RECOVERY - FIRST 15 MIN: Performed by: SURGERY

## 2023-12-27 PROCEDURE — 2720000010 HC SURG SUPPLY STERILE: Performed by: SURGERY

## 2023-12-27 PROCEDURE — 2709999900 HC NON-CHARGEABLE SUPPLY: Performed by: SURGERY

## 2023-12-27 PROCEDURE — 2500000003 HC RX 250 WO HCPCS: Performed by: PHYSICIAN ASSISTANT

## 2023-12-27 PROCEDURE — 97530 THERAPEUTIC ACTIVITIES: CPT

## 2023-12-27 RX ORDER — LIDOCAINE HYDROCHLORIDE 10 MG/ML
1 INJECTION, SOLUTION INFILTRATION; PERINEURAL
Status: DISCONTINUED | OUTPATIENT
Start: 2023-12-27 | End: 2023-12-27 | Stop reason: HOSPADM

## 2023-12-27 RX ORDER — DIPHENHYDRAMINE HCL 25 MG
25 CAPSULE ORAL EVERY 6 HOURS PRN
Status: DISCONTINUED | OUTPATIENT
Start: 2023-12-27 | End: 2023-12-28 | Stop reason: HOSPADM

## 2023-12-27 RX ORDER — ACETAMINOPHEN 325 MG/1
650 TABLET ORAL EVERY 6 HOURS
Status: DISCONTINUED | OUTPATIENT
Start: 2023-12-27 | End: 2023-12-28 | Stop reason: HOSPADM

## 2023-12-27 RX ORDER — MIDAZOLAM HYDROCHLORIDE 1 MG/ML
INJECTION INTRAMUSCULAR; INTRAVENOUS PRN
Status: DISCONTINUED | OUTPATIENT
Start: 2023-12-27 | End: 2023-12-27 | Stop reason: SDUPTHER

## 2023-12-27 RX ORDER — LIDOCAINE HYDROCHLORIDE 20 MG/ML
INJECTION, SOLUTION EPIDURAL; INFILTRATION; INTRACAUDAL; PERINEURAL PRN
Status: DISCONTINUED | OUTPATIENT
Start: 2023-12-27 | End: 2023-12-27 | Stop reason: SDUPTHER

## 2023-12-27 RX ORDER — ONDANSETRON 2 MG/ML
4 INJECTION INTRAMUSCULAR; INTRAVENOUS
Status: DISCONTINUED | OUTPATIENT
Start: 2023-12-27 | End: 2023-12-27 | Stop reason: HOSPADM

## 2023-12-27 RX ORDER — SODIUM CHLORIDE 0.9 % (FLUSH) 0.9 %
5-40 SYRINGE (ML) INJECTION PRN
Status: DISCONTINUED | OUTPATIENT
Start: 2023-12-27 | End: 2023-12-28 | Stop reason: HOSPADM

## 2023-12-27 RX ORDER — ACETAMINOPHEN 325 MG/1
650 TABLET ORAL EVERY 6 HOURS
Status: DISCONTINUED | OUTPATIENT
Start: 2023-12-27 | End: 2023-12-27

## 2023-12-27 RX ORDER — PROCHLORPERAZINE EDISYLATE 5 MG/ML
10 INJECTION INTRAMUSCULAR; INTRAVENOUS EVERY 6 HOURS PRN
Status: DISCONTINUED | OUTPATIENT
Start: 2023-12-27 | End: 2023-12-28 | Stop reason: HOSPADM

## 2023-12-27 RX ORDER — SODIUM CHLORIDE AND POTASSIUM CHLORIDE 150; 900 MG/100ML; MG/100ML
INJECTION, SOLUTION INTRAVENOUS CONTINUOUS
Status: DISCONTINUED | OUTPATIENT
Start: 2023-12-27 | End: 2023-12-28

## 2023-12-27 RX ORDER — SODIUM CHLORIDE 9 MG/ML
INJECTION, SOLUTION INTRAVENOUS PRN
Status: DISCONTINUED | OUTPATIENT
Start: 2023-12-27 | End: 2023-12-28 | Stop reason: HOSPADM

## 2023-12-27 RX ORDER — APREPITANT 40 MG/1
40 CAPSULE ORAL ONCE
Status: COMPLETED | OUTPATIENT
Start: 2023-12-27 | End: 2023-12-27

## 2023-12-27 RX ORDER — NEOSTIGMINE METHYLSULFATE 1 MG/ML
INJECTION, SOLUTION INTRAVENOUS PRN
Status: DISCONTINUED | OUTPATIENT
Start: 2023-12-27 | End: 2023-12-27 | Stop reason: SDUPTHER

## 2023-12-27 RX ORDER — ONDANSETRON 2 MG/ML
INJECTION INTRAMUSCULAR; INTRAVENOUS PRN
Status: DISCONTINUED | OUTPATIENT
Start: 2023-12-27 | End: 2023-12-27 | Stop reason: SDUPTHER

## 2023-12-27 RX ORDER — HYDRALAZINE HYDROCHLORIDE 20 MG/ML
10 INJECTION INTRAMUSCULAR; INTRAVENOUS EVERY 6 HOURS PRN
Status: DISCONTINUED | OUTPATIENT
Start: 2023-12-27 | End: 2023-12-28 | Stop reason: HOSPADM

## 2023-12-27 RX ORDER — DEXAMETHASONE SODIUM PHOSPHATE 10 MG/ML
INJECTION INTRAMUSCULAR; INTRAVENOUS PRN
Status: DISCONTINUED | OUTPATIENT
Start: 2023-12-27 | End: 2023-12-27 | Stop reason: SDUPTHER

## 2023-12-27 RX ORDER — GLYCOPYRROLATE 0.2 MG/ML
INJECTION INTRAMUSCULAR; INTRAVENOUS PRN
Status: DISCONTINUED | OUTPATIENT
Start: 2023-12-27 | End: 2023-12-27 | Stop reason: SDUPTHER

## 2023-12-27 RX ORDER — LOSARTAN POTASSIUM 50 MG/1
50 TABLET ORAL DAILY
Status: DISCONTINUED | OUTPATIENT
Start: 2023-12-27 | End: 2023-12-28 | Stop reason: HOSPADM

## 2023-12-27 RX ORDER — LIDOCAINE HYDROCHLORIDE ANHYDROUS AND DEXTROSE MONOHYDRATE 5; 400 G/100ML; MG/100ML
INJECTION, SOLUTION INTRAVENOUS CONTINUOUS PRN
Status: DISCONTINUED | OUTPATIENT
Start: 2023-12-27 | End: 2023-12-27 | Stop reason: SDUPTHER

## 2023-12-27 RX ORDER — KETAMINE HYDROCHLORIDE 50 MG/ML
INJECTION, SOLUTION INTRAMUSCULAR; INTRAVENOUS PRN
Status: DISCONTINUED | OUTPATIENT
Start: 2023-12-27 | End: 2023-12-27 | Stop reason: SDUPTHER

## 2023-12-27 RX ORDER — OXYCODONE HYDROCHLORIDE 5 MG/1
5 TABLET ORAL PRN
Status: DISCONTINUED | OUTPATIENT
Start: 2023-12-27 | End: 2023-12-27 | Stop reason: HOSPADM

## 2023-12-27 RX ORDER — SIMETHICONE 80 MG
80 TABLET,CHEWABLE ORAL EVERY 6 HOURS PRN
Status: DISCONTINUED | OUTPATIENT
Start: 2023-12-27 | End: 2023-12-28 | Stop reason: HOSPADM

## 2023-12-27 RX ORDER — OXYCODONE HYDROCHLORIDE 5 MG/1
10 TABLET ORAL PRN
Status: DISCONTINUED | OUTPATIENT
Start: 2023-12-27 | End: 2023-12-27 | Stop reason: HOSPADM

## 2023-12-27 RX ORDER — SODIUM CHLORIDE, SODIUM LACTATE, POTASSIUM CHLORIDE, CALCIUM CHLORIDE 600; 310; 30; 20 MG/100ML; MG/100ML; MG/100ML; MG/100ML
INJECTION, SOLUTION INTRAVENOUS CONTINUOUS
Status: DISCONTINUED | OUTPATIENT
Start: 2023-12-27 | End: 2023-12-27 | Stop reason: HOSPADM

## 2023-12-27 RX ORDER — GABAPENTIN 300 MG/1
300 CAPSULE ORAL 3 TIMES DAILY
Status: DISCONTINUED | OUTPATIENT
Start: 2023-12-27 | End: 2023-12-28 | Stop reason: HOSPADM

## 2023-12-27 RX ORDER — LIDOCAINE HYDROCHLORIDE ANHYDROUS AND DEXTROSE MONOHYDRATE 5; 400 G/100ML; MG/100ML
1 INJECTION, SOLUTION INTRAVENOUS CONTINUOUS
Status: ACTIVE | OUTPATIENT
Start: 2023-12-27 | End: 2023-12-28

## 2023-12-27 RX ORDER — SODIUM CHLORIDE 9 MG/ML
INJECTION, SOLUTION INTRAVENOUS PRN
Status: DISCONTINUED | OUTPATIENT
Start: 2023-12-27 | End: 2023-12-27 | Stop reason: HOSPADM

## 2023-12-27 RX ORDER — HYDROMORPHONE HYDROCHLORIDE 1 MG/ML
0.5 INJECTION, SOLUTION INTRAMUSCULAR; INTRAVENOUS; SUBCUTANEOUS EVERY 5 MIN PRN
Status: COMPLETED | OUTPATIENT
Start: 2023-12-27 | End: 2023-12-27

## 2023-12-27 RX ORDER — OXYCODONE HYDROCHLORIDE 5 MG/1
5 TABLET ORAL EVERY 4 HOURS PRN
Status: DISCONTINUED | OUTPATIENT
Start: 2023-12-27 | End: 2023-12-28 | Stop reason: HOSPADM

## 2023-12-27 RX ORDER — ACETAMINOPHEN 500 MG
1000 TABLET ORAL ONCE
Status: DISCONTINUED | OUTPATIENT
Start: 2023-12-27 | End: 2023-12-27 | Stop reason: HOSPADM

## 2023-12-27 RX ORDER — PROPOFOL 10 MG/ML
INJECTION, EMULSION INTRAVENOUS PRN
Status: DISCONTINUED | OUTPATIENT
Start: 2023-12-27 | End: 2023-12-27 | Stop reason: SDUPTHER

## 2023-12-27 RX ORDER — SUCRALFATE 1 G/1
1 TABLET ORAL EVERY 6 HOURS SCHEDULED
Status: DISCONTINUED | OUTPATIENT
Start: 2023-12-27 | End: 2023-12-28 | Stop reason: HOSPADM

## 2023-12-27 RX ORDER — ONDANSETRON 2 MG/ML
4 INJECTION INTRAMUSCULAR; INTRAVENOUS EVERY 6 HOURS PRN
Status: DISCONTINUED | OUTPATIENT
Start: 2023-12-27 | End: 2023-12-28 | Stop reason: HOSPADM

## 2023-12-27 RX ORDER — ROCURONIUM BROMIDE 10 MG/ML
INJECTION, SOLUTION INTRAVENOUS PRN
Status: DISCONTINUED | OUTPATIENT
Start: 2023-12-27 | End: 2023-12-27 | Stop reason: SDUPTHER

## 2023-12-27 RX ORDER — AMLODIPINE BESYLATE 10 MG/1
10 TABLET ORAL DAILY
Status: DISCONTINUED | OUTPATIENT
Start: 2023-12-27 | End: 2023-12-28 | Stop reason: HOSPADM

## 2023-12-27 RX ORDER — PROCHLORPERAZINE EDISYLATE 5 MG/ML
5 INJECTION INTRAMUSCULAR; INTRAVENOUS
Status: DISCONTINUED | OUTPATIENT
Start: 2023-12-27 | End: 2023-12-27 | Stop reason: HOSPADM

## 2023-12-27 RX ORDER — SUCCINYLCHOLINE/SOD CL,ISO/PF 200MG/10ML
SYRINGE (ML) INTRAVENOUS PRN
Status: DISCONTINUED | OUTPATIENT
Start: 2023-12-27 | End: 2023-12-27 | Stop reason: SDUPTHER

## 2023-12-27 RX ORDER — SCOLOPAMINE TRANSDERMAL SYSTEM 1 MG/1
1 PATCH, EXTENDED RELEASE TRANSDERMAL ONCE
Status: DISCONTINUED | OUTPATIENT
Start: 2023-12-27 | End: 2023-12-28 | Stop reason: HOSPADM

## 2023-12-27 RX ORDER — FENTANYL CITRATE 50 UG/ML
INJECTION, SOLUTION INTRAMUSCULAR; INTRAVENOUS PRN
Status: DISCONTINUED | OUTPATIENT
Start: 2023-12-27 | End: 2023-12-27 | Stop reason: SDUPTHER

## 2023-12-27 RX ORDER — OXYCODONE HYDROCHLORIDE 5 MG/1
5 TABLET ORAL
Status: DISCONTINUED | OUTPATIENT
Start: 2023-12-27 | End: 2023-12-27 | Stop reason: HOSPADM

## 2023-12-27 RX ORDER — HYDROMORPHONE HYDROCHLORIDE 1 MG/ML
0.5 INJECTION, SOLUTION INTRAMUSCULAR; INTRAVENOUS; SUBCUTANEOUS
Status: DISCONTINUED | OUTPATIENT
Start: 2023-12-27 | End: 2023-12-28 | Stop reason: HOSPADM

## 2023-12-27 RX ORDER — SODIUM CHLORIDE 0.9 % (FLUSH) 0.9 %
5-40 SYRINGE (ML) INJECTION PRN
Status: DISCONTINUED | OUTPATIENT
Start: 2023-12-27 | End: 2023-12-27 | Stop reason: HOSPADM

## 2023-12-27 RX ORDER — METOCLOPRAMIDE HYDROCHLORIDE 5 MG/ML
10 INJECTION INTRAMUSCULAR; INTRAVENOUS ONCE
Status: COMPLETED | OUTPATIENT
Start: 2023-12-27 | End: 2023-12-27

## 2023-12-27 RX ORDER — MIDAZOLAM HYDROCHLORIDE 2 MG/2ML
2 INJECTION, SOLUTION INTRAMUSCULAR; INTRAVENOUS
Status: DISCONTINUED | OUTPATIENT
Start: 2023-12-27 | End: 2023-12-27 | Stop reason: HOSPADM

## 2023-12-27 RX ORDER — HYDROMORPHONE HYDROCHLORIDE 1 MG/ML
0.25 INJECTION, SOLUTION INTRAMUSCULAR; INTRAVENOUS; SUBCUTANEOUS EVERY 5 MIN PRN
Status: DISCONTINUED | OUTPATIENT
Start: 2023-12-27 | End: 2023-12-27 | Stop reason: HOSPADM

## 2023-12-27 RX ORDER — SODIUM CHLORIDE 0.9 % (FLUSH) 0.9 %
5-40 SYRINGE (ML) INJECTION EVERY 12 HOURS SCHEDULED
Status: DISCONTINUED | OUTPATIENT
Start: 2023-12-27 | End: 2023-12-27 | Stop reason: HOSPADM

## 2023-12-27 RX ORDER — FENTANYL CITRATE 50 UG/ML
100 INJECTION, SOLUTION INTRAMUSCULAR; INTRAVENOUS
Status: DISCONTINUED | OUTPATIENT
Start: 2023-12-27 | End: 2023-12-27 | Stop reason: HOSPADM

## 2023-12-27 RX ORDER — SODIUM CHLORIDE, SODIUM LACTATE, POTASSIUM CHLORIDE, CALCIUM CHLORIDE 600; 310; 30; 20 MG/100ML; MG/100ML; MG/100ML; MG/100ML
INJECTION, SOLUTION INTRAVENOUS CONTINUOUS
Status: DISCONTINUED | OUTPATIENT
Start: 2023-12-27 | End: 2023-12-28

## 2023-12-27 RX ORDER — HYDROMORPHONE HYDROCHLORIDE 1 MG/ML
0.5 INJECTION, SOLUTION INTRAMUSCULAR; INTRAVENOUS; SUBCUTANEOUS EVERY 10 MIN PRN
Status: DISCONTINUED | OUTPATIENT
Start: 2023-12-27 | End: 2023-12-27 | Stop reason: HOSPADM

## 2023-12-27 RX ORDER — BACLOFEN 10 MG/1
10 TABLET ORAL 3 TIMES DAILY
Status: DISCONTINUED | OUTPATIENT
Start: 2023-12-27 | End: 2023-12-28 | Stop reason: HOSPADM

## 2023-12-27 RX ORDER — DIPHENHYDRAMINE HYDROCHLORIDE 50 MG/ML
12.5 INJECTION INTRAMUSCULAR; INTRAVENOUS
Status: DISCONTINUED | OUTPATIENT
Start: 2023-12-27 | End: 2023-12-27 | Stop reason: HOSPADM

## 2023-12-27 RX ORDER — KETOROLAC TROMETHAMINE 30 MG/ML
30 INJECTION, SOLUTION INTRAMUSCULAR; INTRAVENOUS EVERY 6 HOURS
Status: COMPLETED | OUTPATIENT
Start: 2023-12-27 | End: 2023-12-28

## 2023-12-27 RX ORDER — ENOXAPARIN SODIUM 100 MG/ML
30 INJECTION SUBCUTANEOUS EVERY 12 HOURS SCHEDULED
Status: DISCONTINUED | OUTPATIENT
Start: 2023-12-27 | End: 2023-12-28 | Stop reason: HOSPADM

## 2023-12-27 RX ORDER — DIPHENHYDRAMINE HYDROCHLORIDE 50 MG/ML
25 INJECTION INTRAMUSCULAR; INTRAVENOUS EVERY 6 HOURS PRN
Status: DISCONTINUED | OUTPATIENT
Start: 2023-12-27 | End: 2023-12-28 | Stop reason: HOSPADM

## 2023-12-27 RX ORDER — BUPIVACAINE HYDROCHLORIDE 5 MG/ML
INJECTION, SOLUTION EPIDURAL; INTRACAUDAL PRN
Status: DISCONTINUED | OUTPATIENT
Start: 2023-12-27 | End: 2023-12-27 | Stop reason: ALTCHOICE

## 2023-12-27 RX ORDER — EPHEDRINE SULFATE/0.9% NACL/PF 50 MG/5 ML
SYRINGE (ML) INTRAVENOUS PRN
Status: DISCONTINUED | OUTPATIENT
Start: 2023-12-27 | End: 2023-12-27 | Stop reason: SDUPTHER

## 2023-12-27 RX ORDER — SODIUM CHLORIDE 0.9 % (FLUSH) 0.9 %
5-40 SYRINGE (ML) INJECTION EVERY 12 HOURS SCHEDULED
Status: DISCONTINUED | OUTPATIENT
Start: 2023-12-27 | End: 2023-12-28 | Stop reason: HOSPADM

## 2023-12-27 RX ADMIN — Medication 3 MG: at 10:34

## 2023-12-27 RX ADMIN — THIAMINE HYDROCHLORIDE: 100 INJECTION, SOLUTION INTRAMUSCULAR; INTRAVENOUS at 16:37

## 2023-12-27 RX ADMIN — MIDAZOLAM 2 MG: 1 INJECTION INTRAMUSCULAR; INTRAVENOUS at 09:20

## 2023-12-27 RX ADMIN — SODIUM CHLORIDE, POTASSIUM CHLORIDE, SODIUM LACTATE AND CALCIUM CHLORIDE: 600; 310; 30; 20 INJECTION, SOLUTION INTRAVENOUS at 08:34

## 2023-12-27 RX ADMIN — SUCRALFATE 1 G: 1 TABLET ORAL at 17:39

## 2023-12-27 RX ADMIN — HYDROMORPHONE HYDROCHLORIDE 0.5 MG: 1 INJECTION, SOLUTION INTRAMUSCULAR; INTRAVENOUS; SUBCUTANEOUS at 11:17

## 2023-12-27 RX ADMIN — HYDRALAZINE HYDROCHLORIDE 10 MG: 20 INJECTION INTRAMUSCULAR; INTRAVENOUS at 15:15

## 2023-12-27 RX ADMIN — PHENYLEPHRINE HYDROCHLORIDE 50 MCG: 0.1 INJECTION, SOLUTION INTRAVENOUS at 10:00

## 2023-12-27 RX ADMIN — GABAPENTIN 300 MG: 300 CAPSULE ORAL at 20:00

## 2023-12-27 RX ADMIN — BACLOFEN 10 MG: 10 TABLET ORAL at 20:01

## 2023-12-27 RX ADMIN — DEXAMETHASONE SODIUM PHOSPHATE 8 MG: 10 INJECTION INTRAMUSCULAR; INTRAVENOUS at 09:31

## 2023-12-27 RX ADMIN — PHENYLEPHRINE HYDROCHLORIDE 50 MCG: 0.1 INJECTION, SOLUTION INTRAVENOUS at 09:57

## 2023-12-27 RX ADMIN — LIDOCAINE HYDROCHLORIDE 100 MG: 20 INJECTION, SOLUTION EPIDURAL; INFILTRATION; INTRACAUDAL; PERINEURAL at 09:26

## 2023-12-27 RX ADMIN — LIDOCAINE HYDROCHLORIDE 1 MG/KG/HR: 4 INJECTION, SOLUTION INTRAVENOUS at 11:45

## 2023-12-27 RX ADMIN — PROPOFOL 200 MG: 10 INJECTION, EMULSION INTRAVENOUS at 09:26

## 2023-12-27 RX ADMIN — ROCURONIUM BROMIDE 10 MG: 10 INJECTION, SOLUTION INTRAVENOUS at 09:56

## 2023-12-27 RX ADMIN — LIDOCAINE HYDROCHLORIDE 1.5 MG/KG/HR: 4 INJECTION, SOLUTION INTRAVENOUS at 09:32

## 2023-12-27 RX ADMIN — OXYCODONE HYDROCHLORIDE 5 MG: 5 TABLET ORAL at 13:11

## 2023-12-27 RX ADMIN — ROCURONIUM BROMIDE 25 MG: 10 INJECTION, SOLUTION INTRAVENOUS at 09:38

## 2023-12-27 RX ADMIN — ACETAMINOPHEN 650 MG: 325 TABLET, FILM COATED ORAL at 15:16

## 2023-12-27 RX ADMIN — APREPITANT 40 MG: 40 CAPSULE ORAL at 08:28

## 2023-12-27 RX ADMIN — KETOROLAC TROMETHAMINE 30 MG: 30 INJECTION, SOLUTION INTRAMUSCULAR at 15:16

## 2023-12-27 RX ADMIN — HYDROMORPHONE HYDROCHLORIDE 0.5 MG: 1 INJECTION, SOLUTION INTRAMUSCULAR; INTRAVENOUS; SUBCUTANEOUS at 11:12

## 2023-12-27 RX ADMIN — KETOROLAC TROMETHAMINE 30 MG: 30 INJECTION, SOLUTION INTRAMUSCULAR at 20:01

## 2023-12-27 RX ADMIN — ENOXAPARIN SODIUM 30 MG: 100 INJECTION SUBCUTANEOUS at 20:01

## 2023-12-27 RX ADMIN — FENTANYL CITRATE 50 MCG: 50 INJECTION, SOLUTION INTRAMUSCULAR; INTRAVENOUS at 10:07

## 2023-12-27 RX ADMIN — METOCLOPRAMIDE 10 MG: 5 INJECTION, SOLUTION INTRAMUSCULAR; INTRAVENOUS at 08:35

## 2023-12-27 RX ADMIN — FENTANYL CITRATE 50 MCG: 50 INJECTION, SOLUTION INTRAMUSCULAR; INTRAVENOUS at 09:26

## 2023-12-27 RX ADMIN — ACETAMINOPHEN 650 MG: 325 TABLET, FILM COATED ORAL at 20:01

## 2023-12-27 RX ADMIN — SODIUM CHLORIDE, PRESERVATIVE FREE 10 ML: 5 INJECTION INTRAVENOUS at 20:01

## 2023-12-27 RX ADMIN — Medication 5 MG: at 09:58

## 2023-12-27 RX ADMIN — GLYCOPYRROLATE 0.4 MG: 0.2 INJECTION INTRAMUSCULAR; INTRAVENOUS at 10:34

## 2023-12-27 RX ADMIN — HYDROMORPHONE HYDROCHLORIDE 0.5 MG: 1 INJECTION, SOLUTION INTRAMUSCULAR; INTRAVENOUS; SUBCUTANEOUS at 11:44

## 2023-12-27 RX ADMIN — Medication 200 MG: at 09:26

## 2023-12-27 RX ADMIN — ONDANSETRON 4 MG: 2 INJECTION INTRAMUSCULAR; INTRAVENOUS at 10:16

## 2023-12-27 RX ADMIN — KETAMINE HYDROCHLORIDE 30 MG: 50 INJECTION, SOLUTION INTRAMUSCULAR; INTRAVENOUS at 09:33

## 2023-12-27 RX ADMIN — SIMETHICONE 80 MG: 80 TABLET, CHEWABLE ORAL at 20:00

## 2023-12-27 RX ADMIN — ROCURONIUM BROMIDE 5 MG: 10 INJECTION, SOLUTION INTRAVENOUS at 09:26

## 2023-12-27 RX ADMIN — HYDROMORPHONE HYDROCHLORIDE 0.5 MG: 1 INJECTION, SOLUTION INTRAMUSCULAR; INTRAVENOUS; SUBCUTANEOUS at 11:49

## 2023-12-27 RX ADMIN — Medication 2000 MG: at 09:31

## 2023-12-27 ASSESSMENT — PAIN SCALES - GENERAL
PAINLEVEL_OUTOF10: 6
PAINLEVEL_OUTOF10: 8
PAINLEVEL_OUTOF10: 9
PAINLEVEL_OUTOF10: 8
PAINLEVEL_OUTOF10: 9
PAINLEVEL_OUTOF10: 2
PAINLEVEL_OUTOF10: 6
PAINLEVEL_OUTOF10: 3
PAINLEVEL_OUTOF10: 5
PAINLEVEL_OUTOF10: 5

## 2023-12-27 ASSESSMENT — PAIN DESCRIPTION - DESCRIPTORS
DESCRIPTORS: ACHING;CRAMPING;DISCOMFORT
DESCRIPTORS: ACHING
DESCRIPTORS: TENDER;SORE
DESCRIPTORS: ACHING
DESCRIPTORS: ACHING

## 2023-12-27 ASSESSMENT — PAIN DESCRIPTION - LOCATION
LOCATION: ABDOMEN

## 2023-12-27 ASSESSMENT — PAIN DESCRIPTION - PAIN TYPE
TYPE: SURGICAL PAIN
TYPE: SURGICAL PAIN

## 2023-12-27 ASSESSMENT — PAIN - FUNCTIONAL ASSESSMENT: PAIN_FUNCTIONAL_ASSESSMENT: 0-10

## 2023-12-27 NOTE — PERIOP NOTE
TRANSFER - OUT REPORT:    Verbal report given to Do LEE on [facility]  being transferred to [phone] for routine progression of patient care       Report consisted of patient's Situation, Background, Assessment and   Recommendations(SBAR). Information from the following report(s) Nurse Handoff Report was reviewed with the receiving nurse. Lines:   Peripheral IV 12/27/23 Posterior;Right Hand (Active)   Site Assessment Clean, dry & intact 12/27/23 1138   Line Status Infusing 12/27/23 1138   Phlebitis Assessment No symptoms 12/27/23 1138   Infiltration Assessment 0 12/27/23 1138   Dressing Status Clean, dry & intact 12/27/23 1138        Opportunity for questions and clarification was provided.       Patient transported with:  O2 @ 2lpm

## 2023-12-27 NOTE — PROGRESS NOTES
ACUTE PHYSICAL THERAPY GOALS:   (Developed with and agreed upon by patient and/or caregiver.)   Patient will ambulate 650' independently within 1 treatment day.  -GOAL MET 12/27/23   Patient will correctly demonstrate a written HEP within 1 treatment day.  -GOAL MET 12/27/23       PHYSICAL THERAPY Initial Assessment and Discharge  (Link to Caseload Tracking: PT Visit Days : 1  Acknowledge Orders  Time In/Out  PT Charge Capture  Rehab Caseload Tracker    Jenna Lopez is a 48 y.o. female   PRIMARY DIAGNOSIS: Morbid obesity with BMI of 45.0-49.9, adult (HCC)  Morbid obesity (HCC) [E66.01]  Morbid obesity with BMI of 45.0-49.9, adult (HCC) [E66.01, Z68.42]  Procedure(s) (LRB):  ERAS/ GASTRECTOMY SLEEVE LAPAROSCOPIC (N/A)  Day of Surgery  Reason for Referral: Generalized Muscle Weakness (M62.81)  Inpatient: Payor: MEDICARE / Plan: MEDICARE PART A AND B / Product Type: *No Product type* /     ASSESSMENT:     REHAB RECOMMENDATIONS:   Recommendation to date pending progress:  Setting:  No further skilled physical therapy after discharge from hospital    Equipment:    None     ASSESSMENT:  Ms. Lopez admitted for above procedure.  Therapy consulted per [facility] protocol.  Patient is independent at base but reports limited activity due to chronic back pain and nerve damage that affects her R LE.  Patient was able to demonstrate independence with gait outside the room and was able to negotiate the entire 3rd floor without difficulty.  Patient noted that the mobility helped her back pain.  he was able to demonstrate all exercises correctly as well.  Patient was encouraged to continue to ambulate throughout her stay as well as at d/c.  Copy of HEP provided.  She is not demonstrating any acute or d/c needs.       [facility] AM-PAC™ “6 Clicks” Basic Mobility Inpatient Short Form  AM-PAC Basic Mobility - Inpatient   How much help is needed turning from your back to your side while in a flat bed without using  [address] [place] [address]  Minutes: [address] [facility]

## 2023-12-27 NOTE — INTERVAL H&P NOTE
Update History & Physical    The patient's History and Physical of 12/20/23 was reviewed with the patient and I examined the patient. There was no change. The surgical site was confirmed by the patient and me. Plan: The risks, benefits, expected outcome, and alternative to the recommended procedure have been discussed with the patient. Patient understands and wants to proceed with the procedure.      Electronically signed by Ya Cheatham MD on 12/27/2023 at 9:05 AM

## 2023-12-27 NOTE — OP NOTE
Laparascopic Sleeve Gastrectomy Operative Report    Date of Surgery: 12/27/23     Preoperative Diagnosis: Morbid Obesity with a BMI of 46    Postoperative Diagnosis: Same as the above    Procedure: Procedure(s):  ERAS/ GASTRECTOMY SLEEVE LAPAROSCOPIC     Surgeon(s) and Role:     Armand Kocher, MD - Primary     Anesthesia:  GETA plus local     Surgical Assistant: BEATRIZ Eaton. Her expertise with an angled laparoscope, experience with bariatric procedures, retraction and help with closure were essential for this procedure. NAME OF PROCEDURE: LAPAROSCOPIC SLEEVE GASTRECTOMY    ESTIMATED BLOOD LOSS: 25 mL. SPECIMENS: Segment of the stomach. HISTORY: This is a 50 y.o. female who came to the surgical weight loss  clinic at [facility] of her own volition for consideration of bariatric surgery. The patient went to an information session, met with the dietician and psychologist. she came in and I discussed a gastric bypass versus a sleeve gastrectomy with the patient. I recommended a sleeve gastrectomy. The  patient agreed and signed a consent form. For risks, I mentioned risks of bleeding, infection, anesthesia, injury to the esophagus, stomach, small bowel, liver, spleen, large bowel. I also went through risks of myocardial infarction, pneumonia and leak from the staple line of the sleeve gastrectomy. I said that a leak could produce peritonitis, multi-system organ failure, and even death. There is a 1% nationwide mortality rate for this procedure. The patient understood and still wished to proceed. PROCEDURE IN DETAIL: The patient was brought to an operating room at the [address] [place] [address] where general endotracheal anesthesia was administered without complications. She received 2 grams of Ancef as prophylactic antibiotic coverage. The nursing staff applied sequential compression devices which were used throughout the procedure. The abdomen was prepped and draped in the usual

## 2023-12-28 VITALS
SYSTOLIC BLOOD PRESSURE: 146 MMHG | RESPIRATION RATE: 18 BRPM | WEIGHT: 231.8 LBS | HEART RATE: 84 BPM | TEMPERATURE: 98.4 F | DIASTOLIC BLOOD PRESSURE: 89 MMHG | BODY MASS INDEX: 45.51 KG/M2 | HEIGHT: 60 IN | OXYGEN SATURATION: 95 %

## 2023-12-28 LAB
ANION GAP SERPL CALC-SCNC: 4 MMOL/L (ref 2–11)
BASOPHILS # BLD: 0 K/UL (ref 0–0.2)
BASOPHILS NFR BLD: 0 % (ref 0–2)
BUN SERPL-MCNC: 8 MG/DL (ref 6–23)
CALCIUM SERPL-MCNC: 8.9 MG/DL (ref 8.3–10.4)
CHLORIDE SERPL-SCNC: 112 MMOL/L (ref 103–113)
CO2 SERPL-SCNC: 27 MMOL/L (ref 21–32)
CREAT SERPL-MCNC: 0.67 MG/DL (ref 0.6–1)
DIFFERENTIAL METHOD BLD: ABNORMAL
EOSINOPHIL # BLD: 0 K/UL (ref 0–0.8)
EOSINOPHIL NFR BLD: 0 % (ref 0.5–7.8)
ERYTHROCYTE [DISTWIDTH] IN BLOOD BY AUTOMATED COUNT: 14.4 % (ref 11.9–14.6)
GLUCOSE SERPL-MCNC: 112 MG/DL (ref 65–100)
HCT VFR BLD AUTO: 41.5 % (ref 35.8–46.3)
HGB BLD-MCNC: 13.6 G/DL (ref 11.7–15.4)
IMM GRANULOCYTES # BLD AUTO: 0.1 K/UL (ref 0–0.5)
IMM GRANULOCYTES NFR BLD AUTO: 1 % (ref 0–5)
LYMPHOCYTES # BLD: 1 K/UL (ref 0.5–4.6)
LYMPHOCYTES NFR BLD: 9 % (ref 13–44)
MCH RBC QN AUTO: 25.6 PG (ref 26.1–32.9)
MCHC RBC AUTO-ENTMCNC: 32.8 G/DL (ref 31.4–35)
MCV RBC AUTO: 78 FL (ref 82–102)
MONOCYTES # BLD: 0.5 K/UL (ref 0.1–1.3)
MONOCYTES NFR BLD: 4 % (ref 4–12)
NEUTS SEG # BLD: 9.3 K/UL (ref 1.7–8.2)
NEUTS SEG NFR BLD: 86 % (ref 43–78)
NRBC # BLD: 0 K/UL (ref 0–0.2)
PLATELET # BLD AUTO: 326 K/UL (ref 150–450)
PMV BLD AUTO: 10.6 FL (ref 9.4–12.3)
POTASSIUM SERPL-SCNC: 3.7 MMOL/L (ref 3.5–5.1)
RBC # BLD AUTO: 5.32 M/UL (ref 4.05–5.2)
SODIUM SERPL-SCNC: 143 MMOL/L (ref 136–146)
WBC # BLD AUTO: 10.8 K/UL (ref 4.3–11.1)

## 2023-12-28 PROCEDURE — 85025 COMPLETE CBC W/AUTO DIFF WBC: CPT

## 2023-12-28 PROCEDURE — 6360000002 HC RX W HCPCS: Performed by: PHYSICIAN ASSISTANT

## 2023-12-28 PROCEDURE — 6370000000 HC RX 637 (ALT 250 FOR IP): Performed by: PHYSICIAN ASSISTANT

## 2023-12-28 PROCEDURE — 36415 COLL VENOUS BLD VENIPUNCTURE: CPT

## 2023-12-28 PROCEDURE — 6370000000 HC RX 637 (ALT 250 FOR IP): Performed by: NURSE PRACTITIONER

## 2023-12-28 PROCEDURE — 6370000000 HC RX 637 (ALT 250 FOR IP): Performed by: SURGERY

## 2023-12-28 PROCEDURE — 80048 BASIC METABOLIC PNL TOTAL CA: CPT

## 2023-12-28 RX ORDER — PANTOPRAZOLE SODIUM 40 MG/1
40 TABLET, DELAYED RELEASE ORAL
Status: DISCONTINUED | OUTPATIENT
Start: 2023-12-28 | End: 2023-12-28 | Stop reason: HOSPADM

## 2023-12-28 RX ADMIN — ENOXAPARIN SODIUM 30 MG: 100 INJECTION SUBCUTANEOUS at 08:17

## 2023-12-28 RX ADMIN — AMLODIPINE BESYLATE 10 MG: 10 TABLET ORAL at 08:14

## 2023-12-28 RX ADMIN — KETOROLAC TROMETHAMINE 30 MG: 30 INJECTION, SOLUTION INTRAMUSCULAR at 02:07

## 2023-12-28 RX ADMIN — OXYCODONE HYDROCHLORIDE 5 MG: 5 TABLET ORAL at 02:11

## 2023-12-28 RX ADMIN — LOSARTAN POTASSIUM 50 MG: 50 TABLET, FILM COATED ORAL at 08:14

## 2023-12-28 RX ADMIN — PANTOPRAZOLE SODIUM 40 MG: 40 TABLET, DELAYED RELEASE ORAL at 08:14

## 2023-12-28 RX ADMIN — ACETAMINOPHEN 650 MG: 325 TABLET, FILM COATED ORAL at 08:14

## 2023-12-28 RX ADMIN — SUCRALFATE 1 G: 1 TABLET ORAL at 06:12

## 2023-12-28 RX ADMIN — POTASSIUM CHLORIDE AND SODIUM CHLORIDE: 900; 150 INJECTION, SOLUTION INTRAVENOUS at 02:12

## 2023-12-28 RX ADMIN — HYDRALAZINE HYDROCHLORIDE 10 MG: 20 INJECTION INTRAMUSCULAR; INTRAVENOUS at 02:07

## 2023-12-28 RX ADMIN — SUCRALFATE 1 G: 1 TABLET ORAL at 00:39

## 2023-12-28 RX ADMIN — ACETAMINOPHEN 650 MG: 325 TABLET, FILM COATED ORAL at 02:07

## 2023-12-28 RX ADMIN — GABAPENTIN 300 MG: 300 CAPSULE ORAL at 08:14

## 2023-12-28 ASSESSMENT — PAIN DESCRIPTION - LOCATION
LOCATION: ABDOMEN;BACK
LOCATION: BACK;ABDOMEN

## 2023-12-28 ASSESSMENT — PAIN SCALES - GENERAL
PAINLEVEL_OUTOF10: 6
PAINLEVEL_OUTOF10: 6

## 2023-12-28 ASSESSMENT — PAIN DESCRIPTION - DESCRIPTORS
DESCRIPTORS: CRAMPING
DESCRIPTORS: CRAMPING

## 2023-12-28 NOTE — CARE COORDINATION
discharged to: House   Follow Up Appointment: Best Day/Time     One/Two Story Residence:     # of Interior Steps     Height of Each Step (in)     [employer] Available     History of Falls? Services At/After Discharge  Transition of Care Consult (CM Consult): Internal Home Health     Internal Hospice     Reason Outside Agency [facility]     Partner SNF     Reason Why Partner SNF Not Chosen     Internal Comfort Care     Reason Outside [address] Discharge     [address] Provided? Mode of Transport at [facility] Time of Discharge     Confirm Follow Up Transport       Condition of Participation: Discharge Planning  The plan for Transition of Care is related to the following treatment goals: The Patient and/or Patient Representative was provided with a Choice of Provider? Name of the Patient Representative who was provided with the Choice of Provider and agrees with the Discharge Plan? The Patient and/or Patient Representative Agree with the Discharge Plan? Freedom of Choice list was provided with basic dialogue that supports the individualized plan of care/goals, treatment preferences, and shares the quality data associated with the providers?        Documentation for Discharge Appeal  Discharge Appealed by     Date notified by [facility] of appeal request:     Time notified by [facility] of appeal request:     Detailed Notice of Discharge given to:     Date Notice of Discharge given:     Time Notice of Discharge given:     Date records sent to [facility]     Time records sent to [address] [place] [address]     Date Notified of Outcome     Time Notified of Outcome     Outcome of appeal           MILE Cornell 12/28/23 9:32 AM    [facility], [address] [place] [address] Work   [facility]

## 2023-12-28 NOTE — PROGRESS NOTES
Bariatric Surgery Daily Progress Note    Patient: Panda Ortega  MRN: [id number]  Date: 12/28/2023 7:07 AM  Admit Date: 12/27/2023  Procedure: laparoscopic sleeve gastrectomy    Subjective:     Panda Ortega is a 50 y.o. female who is POD #1 s/p LAPAROSCOPIC SLEEVE GASTRECTOMY  completed by Dr. Romario Blevins. [place] on 12/27/2023. Patient sitting up in bed. She reports no nausea but reports mild \"burning sensation \"  in epigastric area when she swallows her liquids. She  has been out of bed and ambulated in gonzales. She is using her incentive spirometer at 1000 cc every hour while awake. Patient reports pain is controlled     ==> Patient has hx of HTN and had some elevated BP readings last night but is on her home BP meds and has PRN Hydralazine . Patent denies any chest pain, dyspnea or dizziness.      Review of Systems   General ROS: negative for - fever or chills  Psychological ROS: negative for - memory difficulties  Ophthalmic ROS: negative for - blurry vision or double vision  Respiratory ROS: negative for - cough, shortness of breath, or wheezing  Cardiovascular ROS: negative for - chest pain  Gastrointestinal ROS: negative for - nausea, vomiting or dry heaving  Musculoskeletal ROS: negative for - pain in the lower extremities  Neurological ROS: negative for - dizziness, light headedness, headaches or numbness     Objective:     MEDS:    Current Facility-Administered Medications   Medication Dose Route Frequency Provider Last Rate Last Admin    scopolamine (TRANSDERM-SCOP) transdermal patch 1 patch  1 patch TransDERmal Once Estefania Flores MD        amLODIPine (NORVASC) tablet 10 mg  10 mg Oral Daily Estefania Flores MD        baclofen (LIORESAL) tablet 10 mg  10 mg Oral TID Estefania Flores MD   10 mg at 12/27/23 2001    losartan (COZAAR) tablet 50 mg  50 mg Oral Daily Estefania Flores MD        lactated ringers IV soln infusion   IntraVENous Continuous Roxanne Runner,

## 2023-12-28 NOTE — FLOWSHEET NOTE
12/28/23 1337   AVS Reviewed   AVS & discharge instructions reviewed with patient and/or representative? Yes   Reviewed instructions with Patient   Level of Understanding Questions answered; Teach back completed;Verbalized understanding;Return demonstration

## 2024-01-05 ENCOUNTER — OFFICE VISIT (OUTPATIENT)
Dept: SURGERY | Age: 49
End: 2024-01-05

## 2024-01-05 VITALS
BODY MASS INDEX: 44.37 KG/M2 | WEIGHT: 226 LBS | HEIGHT: 60 IN | DIASTOLIC BLOOD PRESSURE: 80 MMHG | SYSTOLIC BLOOD PRESSURE: 136 MMHG | HEART RATE: 79 BPM

## 2024-01-05 DIAGNOSIS — Z71.3 DIETARY COUNSELING: ICD-10-CM

## 2024-01-05 DIAGNOSIS — Z98.84 S/P LAPAROSCOPIC SLEEVE GASTRECTOMY: ICD-10-CM

## 2024-01-05 DIAGNOSIS — E66.01 MORBID OBESITY (HCC): Primary | ICD-10-CM

## 2024-01-05 DIAGNOSIS — L29.9 ITCHING: ICD-10-CM

## 2024-01-05 DIAGNOSIS — R10.10 UPPER ABDOMINAL PAIN: ICD-10-CM

## 2024-01-05 DIAGNOSIS — Z71.82 EXERCISE COUNSELING: ICD-10-CM

## 2024-01-05 DIAGNOSIS — E66.01 OBESITY, CLASS III, BMI 40-49.9 (MORBID OBESITY) (HCC): ICD-10-CM

## 2024-01-05 PROBLEM — F41.9 ANXIETY: Status: ACTIVE | Noted: 2024-01-05

## 2024-01-05 PROBLEM — M54.30 SCIATICA: Status: ACTIVE | Noted: 2024-01-05

## 2024-01-05 PROBLEM — K21.9 GASTRO-ESOPHAGEAL REFLUX DISEASE WITHOUT ESOPHAGITIS: Status: ACTIVE | Noted: 2024-01-05

## 2024-01-05 PROBLEM — I10 HTN (HYPERTENSION): Status: ACTIVE | Noted: 2024-01-05

## 2024-01-05 PROBLEM — J45.909 ASTHMA: Status: ACTIVE | Noted: 2024-01-05

## 2024-01-05 PROBLEM — F32.9 MAJOR DEPRESSIVE DISORDER, SINGLE EPISODE, UNSPECIFIED: Status: ACTIVE | Noted: 2024-01-05

## 2024-01-05 PROBLEM — G62.9 NEUROPATHY: Status: ACTIVE | Noted: 2024-01-05

## 2024-01-05 PROBLEM — R13.19 ESOPHAGEAL DYSPHAGIA: Status: ACTIVE | Noted: 2023-12-06

## 2024-01-05 PROCEDURE — 99024 POSTOP FOLLOW-UP VISIT: CPT | Performed by: PHYSICIAN ASSISTANT

## 2024-01-05 RX ORDER — BUPRENORPHINE HYDROCHLORIDE 300 UG/1
FILM, SOLUBLE BUCCAL
COMMUNITY
Start: 2024-01-03

## 2024-01-05 NOTE — PROGRESS NOTES
[facility] NUTRITION REASSESSMENT    Assessment:  Pt is 1 weeks s/p VSG.    Pt is doing well with full liquid diet compliance.    Intake of ~60g protein/d and ~64oz fluid/d.   Pt is eating yogurt and protein shakes; and drinking water and protein shakes.    Pt is exercising via walking and chair exercises for 45 minutes 7x week.    Pt is taking MVI and Ca supplements as recommended.  Pt reports no issues.     Intervention:   Discussed food choices and meal ideas on pureed/smooth diet, once released by surgeon.   Encouraged pt to follow mindful eating behaviors, lean protein focus followed by non-starchy vegetables as able.  Encouraged pt use protein supplements throughout the day as snacks rather than as meal replacement.    Encouraged continued and increased activity.      Monitoring and Evaluation:  Monitor for continued safe, supervised weight loss for VSG.     Monitor pt for meeting protein requirements of 60-80g/d, 64+ fl oz fluids.  Follow for tolerance of pureed/smooth diet.   F/U per MD Tiff Hurst MBA, Nutritionist, RD eligible   
JUICE  1/5/2024    Counseling time:counseling time more than 50% of visit: 20 minutes: I spent this time preparing to see patient (including chart review and preparation), obtaining and/or reviewing additional medical history, performing a physical exam and evaluation, documenting clinical information in the electronic health record, independently interpreting results, communicating results to patient, family or caregiver, and/or coordinating care.

## 2024-01-17 ENCOUNTER — OFFICE VISIT (OUTPATIENT)
Dept: SURGERY | Age: 49
End: 2024-01-17

## 2024-01-17 VITALS
HEART RATE: 87 BPM | DIASTOLIC BLOOD PRESSURE: 99 MMHG | HEIGHT: 60 IN | WEIGHT: 225 LBS | SYSTOLIC BLOOD PRESSURE: 152 MMHG | BODY MASS INDEX: 44.17 KG/M2

## 2024-01-17 DIAGNOSIS — E66.01 OBESITY, CLASS III, BMI 40-49.9 (MORBID OBESITY) (HCC): ICD-10-CM

## 2024-01-17 DIAGNOSIS — E66.01 MORBID OBESITY (HCC): Primary | ICD-10-CM

## 2024-01-17 DIAGNOSIS — Z71.3 DIETARY COUNSELING: ICD-10-CM

## 2024-01-17 DIAGNOSIS — R10.13 EPIGASTRIC DISCOMFORT: ICD-10-CM

## 2024-01-17 DIAGNOSIS — Z71.82 EXERCISE COUNSELING: ICD-10-CM

## 2024-01-17 DIAGNOSIS — Z98.84 S/P LAPAROSCOPIC SLEEVE GASTRECTOMY: ICD-10-CM

## 2024-01-17 PROCEDURE — 99024 POSTOP FOLLOW-UP VISIT: CPT | Performed by: PHYSICIAN ASSISTANT

## 2024-01-17 RX ORDER — FAMOTIDINE 40 MG/1
40 TABLET, FILM COATED ORAL DAILY
COMMUNITY
Start: 2024-01-08 | End: 2025-01-07

## 2024-01-17 RX ORDER — PHENTERMINE HYDROCHLORIDE 37.5 MG/1
TABLET ORAL
COMMUNITY
Start: 2024-01-08

## 2024-01-17 NOTE — PROGRESS NOTES
[facility] NUTRITION REASSESSMENT    Assessment:  Pt is 3 weeks s/p VSG.    Pt is doing well with pureed/smooth diet compliance.    Intake of ~50g protein/d and ~64oz fluid/d.   Pt is eating boiled eggs, applesauce, banana, chicken; and drinking water, smoothies, protein shake.    Pt is exercising via walking and light lifting for 45min 4-5x weekly  Pt is taking MVI and Ca supplements as recommended.  Pt reports sometimes certain food \"feels stuck\".     Intervention:   Discussed food choices and meal ideas on soft diet, once released by surgeon.   Encouraged pt to follow mindful eating behaviors, lean protein focus followed by non-starchy vegetables as able.  Encouraged pt use protein supplements throughout the day as snacks rather than as meal replacement.    Encouraged continued and increased activity.      Monitoring and Evaluation:  Monitor for continued safe, supervised weight loss for VSG.     Monitor pt for meeting protein requirements of 60-80g/d, 64+ fl oz fluids.  Follow for tolerance of soft diet.  F/U per MD Tiff Hurst MBA, Nutritionist, RD eligible   
40 MG tablet Take 1 tablet by mouth daily      phentermine (ADIPEX-P) 37.5 MG tablet       BELBUCA 300 MCG FILM       omeprazole (PRILOSEC) 40 MG delayed release capsule Take 1 capsule by mouth every morning (before breakfast) 90 capsule 0    ondansetron (ZOFRAN) 4 MG tablet Take 1 tablet by mouth 3 times daily as needed for Nausea or Vomiting (Patient not taking: Reported on 12/26/2023) 30 tablet 0    cetirizine (ZYRTEC) 10 MG tablet TAKE 1 TABLET (10 MG TOTAL) BY MOUTH IN THE MORNING      HYDROcodone-acetaminophen (NORCO)  MG per tablet 1 TABLET ORALLY EVERY 6 HOURS AS NEEDED FOR PAIN. MAX 4 TABS PER DAY. USE LOWEST EFFECTIVE DOSE.      baclofen (LIORESAL) 10 MG tablet Take 1 tablet by mouth 3 times daily      Multiple Vitamin (MULTIVITAMIN PO) Take 1 tablet by mouth daily      acetaminophen (TYLENOL) 500 MG tablet Take by mouth as needed      amLODIPine (NORVASC) 10 MG tablet Take 1 tablet by mouth daily      Azelastine-Fluticasone 137-50 MCG/ACT SUSP SPRAY 1 SPRAY INTO EACH NOSTRIL TWICE A DAY      losartan (COZAAR) 50 MG tablet Take 1 tablet by mouth daily TAKE 1 TABLET BY MOUTH EVERY DAY       No current facility-administered medications for this visit.       ALLERGIES:    Allergies   Allergen Reactions    Latex Itching     Other Reaction(s): itching/rash    Adhesive Tape Rash     Other reaction(s): Itching-Allergy, Rash-Allergy    Other Reaction(s): itching/rash    Penicillins Itching and Swelling     Other reaction(s): Hives/Swelling-Allergy, Itching-Allergy, Rash-Allergy    Other Reaction(s): itching, lips swelling    Pollen Extract Itching     Sneezing, pt has an allergist    Cat Hair Extract     Dog Epithelium Allergy Skin Test     Dust Mite Extract        SH:  Social History     Tobacco Use    Smoking status: Never    Smokeless tobacco: Never   Vaping Use    Vaping Use: Never used   Substance Use Topics    Alcohol use: Not Currently    Drug use: Never       FH:  Family History   Problem Relation

## 2024-01-31 NOTE — PROGRESS NOTES
Jayne Asher PA-C  Bariatric & Advanced Laparoscopic Surgery & Endoscopy  [address], [address]  [place] [address]  Phone [phone]   Fax [phone]    Date of visit: 2024          Name: Jenna Lopez      MRN: [id number]       : 1975       Age: 48 y.o.    Sex: female        PCP: Osman Anand Jr., MD     Surgeon: Dr. Neftali Steel  Procedure: laparoscopic sleeve gastrectomy     Surgeon: Damián   DOS: 2023   Procedure: Sleeve   Pre-op weight: 236   Ideal body weight: 130   Excess body weight: 106      HPI:    6 weeks post-op visit after a laparoscopic sleeve gastrectomy was done on 2023.   She has lost 11 lbs since her last office visit.    Weight History Graph  Post-Surgical Weight Loss  Date: 24  Height: 152.4 cm (5')  Weight: 97.1 kg (214 lb)  BMI: 41.79  Weight Change: -11 lbs  Total Weight Change: -22 lbs  % EBWL: 21%    Evaluation of Pre-operative Co-morbid Conditions:    Hypertension Yes, number of medications- 2, off HCTZ   GERD Yes, treatment medication- Omeprazole post op      Clinical Assessment and Physical Exam:    She is doing very well today and is feeling good.  She reports that she has been adhering to the soft diet without difficulty.  She admits to rare right sided abdominal pain which occasionally happens with certain activities.  She denies any nausea or vomiting.  She notes that her heartburn has improved by 80%.  She denies fevers or chills, or any redness or drainage from the incision sites.  She does not report having problems with constipation.  Her pain is well controlled and is not taking pain medications.  She has been taking the PPI without difficulty.  The last few days she has been doing mostly smoothies and protein shakes as she is not feeling hungry.     Protein:  60-70 grams per day  Fluids:    45-64 ounces per day  Exercise:  weights, jogging in place, walking 4-5x per week for 30-45

## 2024-02-07 ENCOUNTER — OFFICE VISIT (OUTPATIENT)
Dept: SURGERY | Age: 49
End: 2024-02-07

## 2024-02-07 VITALS
WEIGHT: 214 LBS | HEART RATE: 109 BPM | SYSTOLIC BLOOD PRESSURE: 157 MMHG | BODY MASS INDEX: 42.01 KG/M2 | DIASTOLIC BLOOD PRESSURE: 90 MMHG | HEIGHT: 60 IN

## 2024-02-07 DIAGNOSIS — R12 HEARTBURN: ICD-10-CM

## 2024-02-07 DIAGNOSIS — Z71.82 EXERCISE COUNSELING: ICD-10-CM

## 2024-02-07 DIAGNOSIS — E66.01 MORBID OBESITY (HCC): Primary | ICD-10-CM

## 2024-02-07 DIAGNOSIS — E66.01 OBESITY, CLASS III, BMI 40-49.9 (MORBID OBESITY) (HCC): ICD-10-CM

## 2024-02-07 DIAGNOSIS — Z98.84 S/P LAPAROSCOPIC SLEEVE GASTRECTOMY: ICD-10-CM

## 2024-02-07 DIAGNOSIS — Z71.3 DIETARY COUNSELING: ICD-10-CM

## 2024-02-07 PROCEDURE — 99024 POSTOP FOLLOW-UP VISIT: CPT | Performed by: PHYSICIAN ASSISTANT

## 2024-03-21 NOTE — PROGRESS NOTES
Bariatric Surgery Follow Up Visit    Patient: Jenna Lopez MRN: [id number]     YOB: 1975  Age: 48 y.o.  Sex: female              PCP: Osman Anand Jr., MD   Date:  3/21/2024    Weight History Graph    Surgeon: Damián   DOS: 12/27/2023   Procedure: Sleeve   Pre-op weight: 236   Ideal body weight: 130   Excess body weight: 106             3 months post-op visit after a sleeve gastrectomy was done on 12/27/2023.   She has lost 10 lbs since her last office visit.    Evaluation of Pre Operative Co Morbid Conditions:    Hypertension-Yes How many medications-Same  GERD-Yes Treatment medication-Omeprazole    Clinical Assessment and Physical Exam:    Doing well 3 months post op. Has a hard time exercising due to chronic back pain. No nausea or vomiting.    Protein:  60-70 gms/day  Fluids:    64+ ounces/day  Exercise:  Walking and weights for 30 minutes per day.   Denies abdominal pain, vomiting, or nausea.  No fever or chills.   Incisions healing well.  Denies reflux. Denies dysphagia.  Constipated  Tolerating Protein first diet without difficulty.    Physical Exam:  There were no vitals taken for this visit.    General: Alert, oriented, cooperative black female in no acute distress.   Eyes: Sclera are clear. Conjunctiva and lids within normal limits. No icterus.  Ears and Nose: no gross deformities to visual inspection, gross hearing intact  Neck: Supple, trachea midline, no appreciable thyromegaly  Resp:  Breathing is  non-labored. Lungs clear to auscultation without wheezing or rhonchi   CV: RRR. No murmurs, rubs or gallops appreciated.  Abd: soft, not distended, active BS'S. No tenderness to palpation. Wounds are clean, dry and intact without signs of infection.       Discussed the patient's BMI with her.  The BMI follow up plan is as follows:  Plan:  Protein first diet  Exercise: keep up current routine  Continue appropriate supplements  Follow up in 3 months or sooner          Neftali

## 2024-03-23 ENCOUNTER — HOSPITAL ENCOUNTER (OUTPATIENT)
Dept: MAMMOGRAPHY | Age: 49
End: 2024-03-23
Payer: MEDICARE

## 2024-03-23 DIAGNOSIS — Z12.31 SCREENING MAMMOGRAM FOR HIGH-RISK PATIENT: ICD-10-CM

## 2024-03-23 PROCEDURE — 77063 BREAST TOMOSYNTHESIS BI: CPT

## 2024-03-26 ENCOUNTER — OFFICE VISIT (OUTPATIENT)
Dept: SURGERY | Age: 49
End: 2024-03-26
Payer: MEDICARE

## 2024-03-26 VITALS
SYSTOLIC BLOOD PRESSURE: 173 MMHG | BODY MASS INDEX: 40.05 KG/M2 | WEIGHT: 204 LBS | HEIGHT: 60 IN | HEART RATE: 114 BPM | DIASTOLIC BLOOD PRESSURE: 96 MMHG

## 2024-03-26 DIAGNOSIS — Z98.84 S/P LAPAROSCOPIC SLEEVE GASTRECTOMY: ICD-10-CM

## 2024-03-26 DIAGNOSIS — E66.01 OBESITY, CLASS III, BMI 40-49.9 (MORBID OBESITY) (HCC): ICD-10-CM

## 2024-03-26 DIAGNOSIS — E66.01 MORBID OBESITY (HCC): Primary | ICD-10-CM

## 2024-03-26 DIAGNOSIS — Z71.3 DIETARY COUNSELING: ICD-10-CM

## 2024-03-26 DIAGNOSIS — Z71.82 EXERCISE COUNSELING: ICD-10-CM

## 2024-03-26 PROCEDURE — G8417 CALC BMI ABV UP PARAM F/U: HCPCS | Performed by: SURGERY

## 2024-03-26 PROCEDURE — G8484 FLU IMMUNIZE NO ADMIN: HCPCS | Performed by: SURGERY

## 2024-03-26 PROCEDURE — 99024 POSTOP FOLLOW-UP VISIT: CPT | Performed by: SURGERY

## 2024-03-26 PROCEDURE — 1036F TOBACCO NON-USER: CPT | Performed by: SURGERY

## 2024-03-26 PROCEDURE — 3077F SYST BP >= 140 MM HG: CPT | Performed by: SURGERY

## 2024-03-26 PROCEDURE — 3080F DIAST BP >= 90 MM HG: CPT | Performed by: SURGERY

## 2024-03-26 PROCEDURE — G8427 DOCREV CUR MEDS BY ELIG CLIN: HCPCS | Performed by: SURGERY

## 2024-03-26 NOTE — PROGRESS NOTES
[facility] NUTRITION REASSESSMENT  Assessment:   3 months post-op VSG. Pt consuming ~60-70g protein/d and ~64+oz fluid/d. Pt is eating at least 3x/d. Pt is drinking water, smoothies, protein shakes. Pt is exercising via walking and some wts (reports back issues). Pt is taking MVI and Ca supplements as recommended.    Diet Recall:   Breakfast: Eggs and apple   Lunch: Grilled chicken and vegetables   Dinner: Grilled chicken and vegetables   Snack: Cheese and grapes     Intervention:   Evaluated diet recall.  Encouraged continued and increased activity - as able    Monitoring and Evaluation:  Monitor for continued safe, supervised weight loss for VSG.   F/U per MD Tiff Hurst MBA, Nutritionist, RD eligible

## 2024-06-18 NOTE — PROGRESS NOTES
Neftali Steel MD   Bariatric & Advanced Laparoscopic Surgery & Endoscopy  [address], [address]  [place] [address]  Phone [phone]   Fax [phone]    Patient: Jenna Lopez MRN: [id number]     YOB: 1975  Age: 48 y.o.  Sex: female      PCP: Osman Anand Jr., MD   Date:  6/18/2024      6 months post-op visit after a sleeve gastrectomy was done on 12/27/2023.   She has lost 11 lbs since her last office visit.    Weight History Graph    Surgeon: Damián   DOS: 12/27/2023   Procedure: Sleeve   Pre-op weight: 236   Ideal body weight: 130   Excess body weight: 106      Last Surgical Weight Loss:      1/3/2024     7:58 AM 1/5/2024     9:54 AM 1/17/2024     2:59 PM 2/7/2024     1:47 PM 3/26/2024     8:46 AM   Surgical Weight Loss Tracker   Ideal Body Weight 130 lb       Surgery Date 12/27/2023       Pre-Surgical Height 5' 0.5\"       Pre-Surgical Weight 236 lb       Pre Surgery BMI 45.33       Weight to Lose 106 lb       Date  1/5/2024 1/17/2024 2/7/2024 3/26/2024   Height  5' 0\" 5' 0\" 5' 0\" 5' 0\"   Weight  226 lb 225 lb 214 lb 204 lb   BMI  44.13 43.94 41.79 39.84   Weight Change  -10 lb -1 lb -11 lb -10 lb   Total Weight Change  -10 lb -11 lb -22 lb -32 lb   % EBWL  9% 10% 21% 30%        Evaluation of pre-operative co-morbid conditions:     Hypertension Yes, number of medications- Down one medication   GERD Yes, treatment medication- Same as before     Clinical Assessment and Physical Exam:    Doing well 6 months post op. Has back pain with possible spinal fusion planned in the next few months.     Protein:  60+ gms/day  Fluids:    64+ ounces/day  Exercise:  Walking, weights and Stepper  Denies abdominal pain. No nausea or vomiting.   No fever or chills.   Denies reflux. Denies dysphagia.  Regular bowel movements.   Tolerating protein first diet without difficulty.    Physical Exam:  There were no vitals taken for this visit.    General: Alert, oriented,

## 2024-06-24 NOTE — PROGRESS NOTES
[facility] NUTRITION REASSESSMENT  Assessment:   6 months post-op VSG. Pt consuming ~60-80g protein/d and ~64+oz fluid/d. Pt is  eating at least 3x/d. Pt is drinking water. Pt is exercising via walking, stepper, wts.     Diet Recall:   Breakfast: Protein smoothie   Lunch: Baked chx with cabbage   Dinner: Baked chx with cabbage   Snack: Grapes and cheese     Intervention:   Evaluated diet recall .  Encouraged continued  activity.        Monitoring and Evaluation:  Monitor for continued safe, supervised weight loss for VSG.   F/U per MD Tiff Hurst MBA. RD, LD

## 2024-06-25 ENCOUNTER — OFFICE VISIT (OUTPATIENT)
Dept: SURGERY | Age: 49
End: 2024-06-25
Payer: COMMERCIAL

## 2024-06-25 VITALS
HEART RATE: 101 BPM | WEIGHT: 193 LBS | DIASTOLIC BLOOD PRESSURE: 92 MMHG | BODY MASS INDEX: 37.89 KG/M2 | SYSTOLIC BLOOD PRESSURE: 147 MMHG | HEIGHT: 60 IN

## 2024-06-25 DIAGNOSIS — E66.9 OBESITY, CLASS II, BMI 35-39.9: ICD-10-CM

## 2024-06-25 DIAGNOSIS — Z71.82 EXERCISE COUNSELING: ICD-10-CM

## 2024-06-25 DIAGNOSIS — Z71.3 DIETARY COUNSELING: ICD-10-CM

## 2024-06-25 DIAGNOSIS — Z98.84 S/P LAPAROSCOPIC SLEEVE GASTRECTOMY: ICD-10-CM

## 2024-06-25 DIAGNOSIS — E66.01 MORBID OBESITY (HCC): Primary | ICD-10-CM

## 2024-06-25 PROCEDURE — G8427 DOCREV CUR MEDS BY ELIG CLIN: HCPCS | Performed by: SURGERY

## 2024-06-25 PROCEDURE — G8417 CALC BMI ABV UP PARAM F/U: HCPCS | Performed by: SURGERY

## 2024-06-25 PROCEDURE — 1036F TOBACCO NON-USER: CPT | Performed by: SURGERY

## 2024-06-25 PROCEDURE — 3077F SYST BP >= 140 MM HG: CPT | Performed by: SURGERY

## 2024-06-25 PROCEDURE — 99213 OFFICE O/P EST LOW 20 MIN: CPT | Performed by: SURGERY

## 2024-06-25 PROCEDURE — 3080F DIAST BP >= 90 MM HG: CPT | Performed by: SURGERY

## 2024-09-19 DIAGNOSIS — Z98.890 HISTORY OF LUMBAR LAMINECTOMY: Primary | ICD-10-CM

## 2024-10-07 ENCOUNTER — HOSPITAL ENCOUNTER (OUTPATIENT)
Dept: GENERAL RADIOLOGY | Age: 49
Discharge: HOME | End: 2024-10-10
Payer: COMMERCIAL

## 2024-10-07 ENCOUNTER — OFFICE VISIT (OUTPATIENT)
Dept: NEUROSURGERY | Age: 49
End: 2024-10-07
Payer: COMMERCIAL

## 2024-10-07 VITALS
TEMPERATURE: 97.2 F | BODY MASS INDEX: 38.4 KG/M2 | DIASTOLIC BLOOD PRESSURE: 105 MMHG | OXYGEN SATURATION: 98 % | SYSTOLIC BLOOD PRESSURE: 182 MMHG | HEART RATE: 72 BPM | HEIGHT: 60 IN | WEIGHT: 195.6 LBS

## 2024-10-07 DIAGNOSIS — M54.9 MECHANICAL BACK PAIN: ICD-10-CM

## 2024-10-07 DIAGNOSIS — Z98.890 HISTORY OF LUMBAR LAMINECTOMY: ICD-10-CM

## 2024-10-07 DIAGNOSIS — M47.9 SPONDYLOSIS: Primary | ICD-10-CM

## 2024-10-07 DIAGNOSIS — M43.17 SPONDYLOLISTHESIS OF LUMBOSACRAL REGION: ICD-10-CM

## 2024-10-07 DIAGNOSIS — M48.07 FORAMINAL STENOSIS OF LUMBOSACRAL REGION: ICD-10-CM

## 2024-10-07 DIAGNOSIS — M96.1 POSTLAMINECTOMY SYNDROME, LUMBAR REGION: ICD-10-CM

## 2024-10-07 PROCEDURE — 3080F DIAST BP >= 90 MM HG: CPT | Performed by: STUDENT IN AN ORGANIZED HEALTH CARE EDUCATION/TRAINING PROGRAM

## 2024-10-07 PROCEDURE — 3077F SYST BP >= 140 MM HG: CPT | Performed by: STUDENT IN AN ORGANIZED HEALTH CARE EDUCATION/TRAINING PROGRAM

## 2024-10-07 PROCEDURE — 99214 OFFICE O/P EST MOD 30 MIN: CPT | Performed by: STUDENT IN AN ORGANIZED HEALTH CARE EDUCATION/TRAINING PROGRAM

## 2024-10-07 PROCEDURE — 72120 X-RAY BEND ONLY L-S SPINE: CPT

## 2024-10-07 NOTE — PROGRESS NOTES
[facility]      Neurosurgery History and Physical Clinic Note         Patient Name: Jenna Lopez    Medical Record Number: [id number]    YOB: 1975    Date of Visit: 10/07/24         Visit Type: New Patient         CHIEF COMPLAINT:    Chief Complaint   Patient presents with    Back Pain            HISTORY OF PRESENT ILLNESS:    I had the pleasure of meeting Jenna Lopez in the neurosurgical clinic today. Jenna Lopez is a pleasant 49 y.o. year young female who presented with complaints of chronic lower back pain and right greater than left leg pain.  Patient is well-known to our clinic.  Patient had a previous right L5-S1 hemilaminectomy and discectomy by Dr. Mckenzie in 2013.  Patient subsequently underwent a redo right L5-S1 laminectomy and discectomy for recurrent disc in 2014..  Patient continues to have pain so a spinal cord stimulator was placed in 2015 for postlaminectomy syndrome.  Unfortunately 3 months later the spinal cord stimulator was removed due to postop infection.  Patient continues to have low back pain and leg pain.  In 2022 patient had an EMG and nerve conduction study which showed evidence more consistent with chronic S1 radiculopathy without any peripheral neuropathy or left lower radiculopathy.  Patient underwent 2 more epidural injection and got some short-term relief but again pain returned.  Subsequent MRIs have showed complete collapse of the L5-S1 disc with some mild retrolisthesis at L5-S1.  Patient saw my partner Dr. Km Gibbs in 2022 and 2023 who felt patient was a surgical candidate however in 2023 patient had a BMI of 46.3.  Due to patient's high BMI patient was encouraged to go try to lose some weight before surgery is attempted given her previous history of infection and her risk for postoperative complication.  Patient returned today after having a gastric sleeve and reports her current BMI is 38.2. Patient

## 2024-10-10 ENCOUNTER — OFFICE VISIT (OUTPATIENT)
Dept: VASCULAR SURGERY | Age: 49
End: 2024-10-10
Payer: COMMERCIAL

## 2024-10-10 VITALS
SYSTOLIC BLOOD PRESSURE: 165 MMHG | OXYGEN SATURATION: 96 % | HEART RATE: 99 BPM | HEIGHT: 60 IN | DIASTOLIC BLOOD PRESSURE: 110 MMHG | WEIGHT: 191 LBS | BODY MASS INDEX: 37.5 KG/M2

## 2024-10-10 DIAGNOSIS — M54.16 LUMBAR RADICULOPATHY: Primary | ICD-10-CM

## 2024-10-10 PROCEDURE — G8417 CALC BMI ABV UP PARAM F/U: HCPCS | Performed by: STUDENT IN AN ORGANIZED HEALTH CARE EDUCATION/TRAINING PROGRAM

## 2024-10-10 PROCEDURE — G8484 FLU IMMUNIZE NO ADMIN: HCPCS | Performed by: STUDENT IN AN ORGANIZED HEALTH CARE EDUCATION/TRAINING PROGRAM

## 2024-10-10 PROCEDURE — 99205 OFFICE O/P NEW HI 60 MIN: CPT | Performed by: STUDENT IN AN ORGANIZED HEALTH CARE EDUCATION/TRAINING PROGRAM

## 2024-10-10 PROCEDURE — 3077F SYST BP >= 140 MM HG: CPT | Performed by: STUDENT IN AN ORGANIZED HEALTH CARE EDUCATION/TRAINING PROGRAM

## 2024-10-10 PROCEDURE — 1036F TOBACCO NON-USER: CPT | Performed by: STUDENT IN AN ORGANIZED HEALTH CARE EDUCATION/TRAINING PROGRAM

## 2024-10-10 PROCEDURE — G8427 DOCREV CUR MEDS BY ELIG CLIN: HCPCS | Performed by: STUDENT IN AN ORGANIZED HEALTH CARE EDUCATION/TRAINING PROGRAM

## 2024-10-10 PROCEDURE — 3080F DIAST BP >= 90 MM HG: CPT | Performed by: STUDENT IN AN ORGANIZED HEALTH CARE EDUCATION/TRAINING PROGRAM

## 2024-10-10 NOTE — PROGRESS NOTES
VASCULAR SURGERY   [address] [address]  [phone] FAX: [phone]        Jenna Lopez  : 1975    Reason for visit: ALIF exposure evaluation    Chief Complaint: 49 y.o. female presents with history of gastric bypass with reduction of BMI from 46-36 as well as 2 C-sections previously.  Dr. Mcgrath wishes to perform L5-S1 ALIF.  Risk benefits alternatives discussed with patient.    Plan:   Lumbar radiculopathy: Will plan ALIF exposure of L5-S1.  High risk for perioperative complications given elevated BMI.  Patient understands risks (including but not limited to vascular injury, poor wound healing, hernia) benefits and alternatives and wishes to proceed.    Imaging interrupted:   MRI    Physical Examination:   Height: 1.524 m (5'), Weight - Scale: 86.6 kg (191 lb), BP: (!) 165/110    General: No acute distress  HENT: AT  CV: Regular rhythm.    LUNG: No respiratory distress on RA  Abdominal: No distension.  Extremities: No wounds or edema, motor and sensation grossly intact  Vascular:   Radial:  L    2+     R    2+    Femoral: L    2+     R    2+    Past Medical History:   Diagnosis Date    Anxiety     AR (allergic rhinitis)     Arthritis     back    Asthma     rescue inhaler    Chronic back pain          Chronic pain syndrome     Depression     History of methicillin resistant Staph aureus     Hypertension     managed with meds    Low back pain     Lumbosacral disc disease     Morbid obesity      46 bmi    Neuropathy of right foot          Pure hypercholesterolemia     Sciatica     Sinus problem        Current Outpatient Medications   Medication Sig Dispense Refill    phentermine (ADIPEX-P) 37.5 MG tablet       BELBUCA 300 MCG FILM       omeprazole (PRILOSEC) 40 MG delayed release capsule Take 1 capsule by mouth every morning (before breakfast) 90 capsule 0    cetirizine (ZYRTEC) 10 MG tablet TAKE 1 TABLET (10 MG TOTAL) BY MOUTH IN THE MORNING

## 2024-10-14 ENCOUNTER — HOSPITAL ENCOUNTER (OUTPATIENT)
Dept: CT IMAGING | Age: 49
Discharge: HOME | End: 2024-10-17
Payer: COMMERCIAL

## 2024-10-14 ENCOUNTER — HOSPITAL ENCOUNTER (OUTPATIENT)
Dept: GENERAL RADIOLOGY | Age: 49
Discharge: HOME | End: 2024-10-16
Payer: COMMERCIAL

## 2024-10-14 DIAGNOSIS — M47.9 SPONDYLOSIS: ICD-10-CM

## 2024-10-14 DIAGNOSIS — M96.1 POSTLAMINECTOMY SYNDROME, LUMBAR REGION: ICD-10-CM

## 2024-10-14 DIAGNOSIS — M54.9 MECHANICAL BACK PAIN: ICD-10-CM

## 2024-10-14 DIAGNOSIS — M43.17 SPONDYLOLISTHESIS OF LUMBOSACRAL REGION: ICD-10-CM

## 2024-10-14 DIAGNOSIS — M48.07 FORAMINAL STENOSIS OF LUMBOSACRAL REGION: ICD-10-CM

## 2024-10-14 PROCEDURE — 72120 X-RAY BEND ONLY L-S SPINE: CPT

## 2024-10-14 PROCEDURE — 72131 CT LUMBAR SPINE W/O DYE: CPT

## 2024-10-14 PROCEDURE — 72082 X-RAY EXAM ENTIRE SPI 2/3 VW: CPT

## 2024-10-14 PROCEDURE — 72040 X-RAY EXAM NECK SPINE 2-3 VW: CPT

## 2024-10-15 ENCOUNTER — PREP FOR PROCEDURE (OUTPATIENT)
Dept: NEUROSURGERY | Age: 49
End: 2024-10-15

## 2024-10-15 DIAGNOSIS — M43.17 SPONDYLOLISTHESIS, LUMBOSACRAL REGION: ICD-10-CM

## 2024-10-15 DIAGNOSIS — M47.9 SPONDYLOSIS, UNSPECIFIED: ICD-10-CM

## 2024-10-15 DIAGNOSIS — M54.9 MECHANICAL BACK PAIN: ICD-10-CM

## 2024-10-25 ENCOUNTER — HOSPITAL ENCOUNTER (OUTPATIENT)
Dept: SURGERY | Age: 49
Discharge: HOME | End: 2024-10-28
Payer: COMMERCIAL

## 2024-10-25 VITALS
HEIGHT: 60 IN | SYSTOLIC BLOOD PRESSURE: 138 MMHG | DIASTOLIC BLOOD PRESSURE: 78 MMHG | BODY MASS INDEX: 37.77 KG/M2 | TEMPERATURE: 97.3 F | HEART RATE: 94 BPM | WEIGHT: 192.4 LBS | OXYGEN SATURATION: 100 % | RESPIRATION RATE: 16 BRPM

## 2024-10-25 LAB
ANION GAP SERPL CALC-SCNC: 12 MMOL/L (ref 9–18)
APPEARANCE UR: CLEAR
BASOPHILS # BLD: 0 K/UL (ref 0–0.2)
BASOPHILS NFR BLD: 0 % (ref 0–2)
BILIRUB UR QL: NEGATIVE
BUN SERPL-MCNC: 11 MG/DL (ref 6–23)
CALCIUM SERPL-MCNC: 9.6 MG/DL (ref 8.8–10.2)
CHLORIDE SERPL-SCNC: 103 MMOL/L (ref 98–107)
CO2 SERPL-SCNC: 25 MMOL/L (ref 20–28)
COLOR UR: NORMAL
CREAT SERPL-MCNC: 0.86 MG/DL (ref 0.6–1.1)
DIFFERENTIAL METHOD BLD: ABNORMAL
EOSINOPHIL # BLD: 0.1 K/UL (ref 0–0.8)
EOSINOPHIL NFR BLD: 1 % (ref 0.5–7.8)
ERYTHROCYTE [DISTWIDTH] IN BLOOD BY AUTOMATED COUNT: 15.3 % (ref 11.9–14.6)
GLUCOSE SERPL-MCNC: 91 MG/DL (ref 70–99)
GLUCOSE UR STRIP.AUTO-MCNC: NEGATIVE MG/DL
HCT VFR BLD AUTO: 43.9 % (ref 35.8–46.3)
HGB BLD-MCNC: 14.5 G/DL (ref 11.7–15.4)
HGB UR QL STRIP: NEGATIVE
IMM GRANULOCYTES # BLD AUTO: 0 K/UL (ref 0–0.5)
IMM GRANULOCYTES NFR BLD AUTO: 0 % (ref 0–5)
KETONES UR QL STRIP.AUTO: NEGATIVE MG/DL
LEUKOCYTE ESTERASE UR QL STRIP.AUTO: NEGATIVE
LYMPHOCYTES # BLD: 2.7 K/UL (ref 0.5–4.6)
LYMPHOCYTES NFR BLD: 40 % (ref 13–44)
MCH RBC QN AUTO: 26.3 PG (ref 26.1–32.9)
MCHC RBC AUTO-ENTMCNC: 33 G/DL (ref 31.4–35)
MCV RBC AUTO: 79.7 FL (ref 82–102)
MONOCYTES # BLD: 0.5 K/UL (ref 0.1–1.3)
MONOCYTES NFR BLD: 8 % (ref 4–12)
MRSA DNA SPEC QL NAA+PROBE: NOT DETECTED
NEUTS SEG # BLD: 3.4 K/UL (ref 1.7–8.2)
NEUTS SEG NFR BLD: 51 % (ref 43–78)
NITRITE UR QL STRIP.AUTO: NEGATIVE
NRBC # BLD: 0 K/UL (ref 0–0.2)
PH UR STRIP: 7 (ref 5–9)
PLATELET # BLD AUTO: 239 K/UL (ref 150–450)
PMV BLD AUTO: 11.1 FL (ref 9.4–12.3)
POTASSIUM SERPL-SCNC: 4.4 MMOL/L (ref 3.5–5.1)
PROT UR STRIP-MCNC: NEGATIVE MG/DL
RBC # BLD AUTO: 5.51 M/UL (ref 4.05–5.2)
S AUREUS CPE NOSE QL NAA+PROBE: DETECTED
SODIUM SERPL-SCNC: 140 MMOL/L (ref 136–145)
SP GR UR REFRACTOMETRY: 1.01 (ref 1–1.02)
UROBILINOGEN UR QL STRIP.AUTO: 0.2 EU/DL (ref 0.2–1)
WBC # BLD AUTO: 6.7 K/UL (ref 4.3–11.1)

## 2024-10-25 PROCEDURE — 87641 MR-STAPH DNA AMP PROBE: CPT

## 2024-10-25 PROCEDURE — 80048 BASIC METABOLIC PNL TOTAL CA: CPT

## 2024-10-25 PROCEDURE — 85025 COMPLETE CBC W/AUTO DIFF WBC: CPT

## 2024-10-25 PROCEDURE — 81003 URINALYSIS AUTO W/O SCOPE: CPT

## 2024-10-25 RX ORDER — DIPHENHYDRAMINE HCL 25 MG
25 TABLET ORAL AS NEEDED
COMMUNITY

## 2024-10-25 RX ORDER — HYDROCHLOROTHIAZIDE 25 MG/1
25 TABLET ORAL DAILY
COMMUNITY

## 2024-10-25 RX ORDER — PREGABALIN 150 MG/1
150 CAPSULE ORAL AS NEEDED
COMMUNITY

## 2024-10-25 RX ORDER — MONTELUKAST SODIUM 10 MG/1
10 TABLET ORAL NIGHTLY
COMMUNITY

## 2024-10-25 ASSESSMENT — PAIN SCALES - GENERAL: PAINLEVEL_OUTOF10: 5

## 2024-10-25 ASSESSMENT — PAIN DESCRIPTION - LOCATION: LOCATION: BACK

## 2024-10-25 ASSESSMENT — PAIN DESCRIPTION - DESCRIPTORS: DESCRIPTORS: ACHING

## 2024-10-25 ASSESSMENT — PAIN DESCRIPTION - ORIENTATION: ORIENTATION: LOWER

## 2024-10-25 NOTE — PERIOP NOTE
Patient verified name, , and surgery as listed in [facility]. Patient provided medical/health information and PTA medications to the best of their ability.    TYPE  CASE: 3  Orders per surgeon: No Orders received  Labs per surgeon: CBC with diff, BMP, UA,MRSA. Results: pending.  Labs per anesthesia protocol: T/S DOS.  EKG:  NA     Chart flagged for anesthesia review-pt seen by PCP on 10.8.24 for runny nose, cough, Sinusitis, and congestion. Pt treated for upper respiratory infection and prescribed Zithromax and Decadron injection. Pt currently denies any chest congestion/cough.    Please drink 32 ounces of water 2 hours prior to your arrival to avoid dehydration.      MRSA/MSSA swab collected; pharmacy to review and dose antibiotic as appropriate.     Patient provided with and instructed on education handouts including Guide to Surgery,  Preventing Infections, Pain Management, and [facility] brochure.     Road to Recovery Spine surgery patient guide given. Instructed on incentive spirometry.     Surgical skin cleanser and instructions given per hospital policy.    Original medication prescription bottles were visualized during patient appointment.     Patient teach back successful and patient demonstrates knowledge of instruction.

## 2024-10-25 NOTE — PERIOP NOTE
Labs within anesthesia guidelines, no follow-up required. Labs automatically routed to ordering provider via Epic documentation. MRSA received in lab with pending results.

## 2024-10-25 NOTE — PERIOP NOTE
PLEASE CONTINUE TAKING ALL PRESCRIPTION MEDICATIONS UP TO THE DAY OF SURGERY UNLESS OTHERWISE DIRECTED BELOW. You may take Tylenol, allergy,  and/or indigestion medications.     TAKE ONLY THESE MEDICATIONS ON THE DAY OF SURGERY      Amlodipine (Norvasc)     Omeprazole (Prilosec)     Hydrocodone- take as instructed if needed.     DISCONTINUE all vitamins and supplements 7 days prior to surgery. DISCONTINUE Non-Steroidal Anti-Inflammatory (NSAIDS) such as Advil,Ibuprofen, Excedrin, Motrin, and Aleve 5 days prior to surgery.     Home Medications to Hold- please continue all other medications except these.      Multivitamin- hold 7 days prior to surgery. Take last dose on 10.31.24   Phentermine (Adipex)- hold 5 days prior to surgery. Take last dose on 11.2.24     Comments     Hibiclens shower the night before surgery and the morning of surgery.   Please drink 32 ounces of water 2 hours prior to your arrival to avoid dehydration          Please do not bring home medications with you on the day of surgery unless otherwise directed by your nurse.  If you are instructed to bring home medications, please give them to your nurse as they will be administered by the nursing staff.    If you have any questions, please call [facility] [phone].    A copy of this note was provided to the patient for reference.        How to Use Your Incentive Spirometer       About Your Incentive Spirometer  An incentive spirometer is a device that will expand your lungs by helping you to breathe more deeply and fully. The parts of your incentive spirometer are labeled in Figure 1.    Using your incentive spirometer  When you're using your incentive spirometer, make sure to breathe through your mouth. If you breathe through your nose, the incentive spirometer won't work properly. You can hold your nose if you have trouble. DO NOT BLOW INTO THE DEVICE. If you feel dizzy at any time, stop and rest. Try again at a later

## 2024-11-07 ENCOUNTER — ANESTHESIA EVENT (OUTPATIENT)
Dept: SURGERY | Age: 49
DRG: 402 | End: 2024-11-07
Payer: COMMERCIAL

## 2024-11-07 PROCEDURE — 99221 1ST HOSP IP/OBS SF/LOW 40: CPT | Performed by: STUDENT IN AN ORGANIZED HEALTH CARE EDUCATION/TRAINING PROGRAM

## 2024-11-08 ENCOUNTER — ANESTHESIA (OUTPATIENT)
Dept: SURGERY | Age: 49
DRG: 402 | End: 2024-11-08
Payer: COMMERCIAL

## 2024-11-08 ENCOUNTER — APPOINTMENT (OUTPATIENT)
Dept: GENERAL RADIOLOGY | Age: 49
DRG: 402 | End: 2024-11-08
Attending: STUDENT IN AN ORGANIZED HEALTH CARE EDUCATION/TRAINING PROGRAM
Payer: COMMERCIAL

## 2024-11-08 ENCOUNTER — HOSPITAL ENCOUNTER (INPATIENT)
Age: 49
LOS: 5 days | Discharge: HOME HEALTH | DRG: 402 | End: 2024-11-13
Attending: STUDENT IN AN ORGANIZED HEALTH CARE EDUCATION/TRAINING PROGRAM | Admitting: STUDENT IN AN ORGANIZED HEALTH CARE EDUCATION/TRAINING PROGRAM
Payer: COMMERCIAL

## 2024-11-08 DIAGNOSIS — G89.18 POST-OPERATIVE PAIN: Primary | ICD-10-CM

## 2024-11-08 LAB
ABO + RH BLD: NORMAL
BLOOD GROUP ANTIBODIES SERPL: NORMAL
HCG UR QL: NEGATIVE
POTASSIUM BLD-SCNC: 3.2 MMOL/L (ref 3.5–5.1)
SPECIMEN EXP DATE BLD: NORMAL

## 2024-11-08 PROCEDURE — 7100000000 HC PACU RECOVERY - FIRST 15 MIN: Performed by: STUDENT IN AN ORGANIZED HEALTH CARE EDUCATION/TRAINING PROGRAM

## 2024-11-08 PROCEDURE — 86901 BLOOD TYPING SEROLOGIC RH(D): CPT

## 2024-11-08 PROCEDURE — 2580000003 HC RX 258: Performed by: STUDENT IN AN ORGANIZED HEALTH CARE EDUCATION/TRAINING PROGRAM

## 2024-11-08 PROCEDURE — 3600000014 HC SURGERY LEVEL 4 ADDTL 15MIN: Performed by: STUDENT IN AN ORGANIZED HEALTH CARE EDUCATION/TRAINING PROGRAM

## 2024-11-08 PROCEDURE — 0SG30A0 FUSION OF LUMBOSACRAL JOINT WITH INTERBODY FUSION DEVICE, ANTERIOR APPROACH, ANTERIOR COLUMN, OPEN APPROACH: ICD-10-PCS | Performed by: STUDENT IN AN ORGANIZED HEALTH CARE EDUCATION/TRAINING PROGRAM

## 2024-11-08 PROCEDURE — 3600000004 HC SURGERY LEVEL 4 BASE: Performed by: STUDENT IN AN ORGANIZED HEALTH CARE EDUCATION/TRAINING PROGRAM

## 2024-11-08 PROCEDURE — A4217 STERILE WATER/SALINE, 500 ML: HCPCS | Performed by: STUDENT IN AN ORGANIZED HEALTH CARE EDUCATION/TRAINING PROGRAM

## 2024-11-08 PROCEDURE — 0SG30K1 FUSION OF LUMBOSACRAL JOINT WITH NONAUTOLOGOUS TISSUE SUBSTITUTE, POSTERIOR APPROACH, POSTERIOR COLUMN, OPEN APPROACH: ICD-10-PCS | Performed by: STUDENT IN AN ORGANIZED HEALTH CARE EDUCATION/TRAINING PROGRAM

## 2024-11-08 PROCEDURE — 6360000002 HC RX W HCPCS: Performed by: NURSE ANESTHETIST, CERTIFIED REGISTERED

## 2024-11-08 PROCEDURE — 81025 URINE PREGNANCY TEST: CPT

## 2024-11-08 PROCEDURE — 2709999900 HC NON-CHARGEABLE SUPPLY: Performed by: STUDENT IN AN ORGANIZED HEALTH CARE EDUCATION/TRAINING PROGRAM

## 2024-11-08 PROCEDURE — 2500000003 HC RX 250 WO HCPCS: Performed by: STUDENT IN AN ORGANIZED HEALTH CARE EDUCATION/TRAINING PROGRAM

## 2024-11-08 PROCEDURE — 6360000002 HC RX W HCPCS: Performed by: STUDENT IN AN ORGANIZED HEALTH CARE EDUCATION/TRAINING PROGRAM

## 2024-11-08 PROCEDURE — 6370000000 HC RX 637 (ALT 250 FOR IP): Performed by: STUDENT IN AN ORGANIZED HEALTH CARE EDUCATION/TRAINING PROGRAM

## 2024-11-08 PROCEDURE — 0ST40ZZ RESECTION OF LUMBOSACRAL DISC, OPEN APPROACH: ICD-10-PCS | Performed by: STUDENT IN AN ORGANIZED HEALTH CARE EDUCATION/TRAINING PROGRAM

## 2024-11-08 PROCEDURE — 22853 INSJ BIOMECHANICAL DEVICE: CPT | Performed by: STUDENT IN AN ORGANIZED HEALTH CARE EDUCATION/TRAINING PROGRAM

## 2024-11-08 PROCEDURE — 3700000000 HC ANESTHESIA ATTENDED CARE: Performed by: STUDENT IN AN ORGANIZED HEALTH CARE EDUCATION/TRAINING PROGRAM

## 2024-11-08 PROCEDURE — C1889 IMPLANT/INSERT DEVICE, NOC: HCPCS | Performed by: STUDENT IN AN ORGANIZED HEALTH CARE EDUCATION/TRAINING PROGRAM

## 2024-11-08 PROCEDURE — 7100000001 HC PACU RECOVERY - ADDTL 15 MIN: Performed by: STUDENT IN AN ORGANIZED HEALTH CARE EDUCATION/TRAINING PROGRAM

## 2024-11-08 PROCEDURE — 84132 ASSAY OF SERUM POTASSIUM: CPT

## 2024-11-08 PROCEDURE — C1763 CONN TISS, NON-HUMAN: HCPCS | Performed by: STUDENT IN AN ORGANIZED HEALTH CARE EDUCATION/TRAINING PROGRAM

## 2024-11-08 PROCEDURE — 22558 ARTHRD ANT NTRBD MIN DSC LUM: CPT | Performed by: STUDENT IN AN ORGANIZED HEALTH CARE EDUCATION/TRAINING PROGRAM

## 2024-11-08 PROCEDURE — 86850 RBC ANTIBODY SCREEN: CPT

## 2024-11-08 PROCEDURE — 3700000001 HC ADD 15 MINUTES (ANESTHESIA): Performed by: STUDENT IN AN ORGANIZED HEALTH CARE EDUCATION/TRAINING PROGRAM

## 2024-11-08 PROCEDURE — 22612 ARTHRD PST TQ 1NTRSPC LUMBAR: CPT | Performed by: STUDENT IN AN ORGANIZED HEALTH CARE EDUCATION/TRAINING PROGRAM

## 2024-11-08 PROCEDURE — 86900 BLOOD TYPING SEROLOGIC ABO: CPT

## 2024-11-08 PROCEDURE — 2580000003 HC RX 258: Performed by: NURSE ANESTHETIST, CERTIFIED REGISTERED

## 2024-11-08 PROCEDURE — 2500000003 HC RX 250 WO HCPCS: Performed by: NURSE ANESTHETIST, CERTIFIED REGISTERED

## 2024-11-08 PROCEDURE — 22840 INSERT SPINE FIXATION DEVICE: CPT | Performed by: STUDENT IN AN ORGANIZED HEALTH CARE EDUCATION/TRAINING PROGRAM

## 2024-11-08 PROCEDURE — C1713 ANCHOR/SCREW BN/BN,TIS/BN: HCPCS | Performed by: STUDENT IN AN ORGANIZED HEALTH CARE EDUCATION/TRAINING PROGRAM

## 2024-11-08 PROCEDURE — 2720000010 HC SURG SUPPLY STERILE: Performed by: STUDENT IN AN ORGANIZED HEALTH CARE EDUCATION/TRAINING PROGRAM

## 2024-11-08 PROCEDURE — P9041 ALBUMIN (HUMAN),5%, 50ML: HCPCS | Performed by: NURSE ANESTHETIST, CERTIFIED REGISTERED

## 2024-11-08 PROCEDURE — 1100000000 HC RM PRIVATE

## 2024-11-08 DEVICE — IMPLANTABLE DEVICE: Type: IMPLANTABLE DEVICE | Site: SPINE LUMBAR | Status: FUNCTIONAL

## 2024-11-08 DEVICE — ROD SPNL L50MM DIA5.5MM POST THORACOLUMBOSACRAL TI LORD: Type: IMPLANTABLE DEVICE | Site: SPINE LUMBAR | Status: FUNCTIONAL

## 2024-11-08 DEVICE — K WIRE FIX NIT BVL BLNT TIP PRECEPT: Type: IMPLANTABLE DEVICE | Site: SPINE LUMBAR | Status: FUNCTIONAL

## 2024-11-08 DEVICE — GRAFT BONE SUB 5ML PUTTY CA PHOS SYNTH GRAN BIOABSRB ATTRAX: Type: IMPLANTABLE DEVICE | Site: SPINE LUMBAR | Status: FUNCTIONAL

## 2024-11-08 DEVICE — SCREW SPNL DIA5.5MM OPN TULIP LOK RELINE: Type: IMPLANTABLE DEVICE | Site: SPINE LUMBAR | Status: FUNCTIONAL

## 2024-11-08 DEVICE — ROD SPNL L45MM DIA5.5MM POST THORACOLUMBOSACRAL TI LORD: Type: IMPLANTABLE DEVICE | Site: SPINE LUMBAR | Status: FUNCTIONAL

## 2024-11-08 DEVICE — CAGE FUSION MODULUS ALIF BOLT 5.0X20MM FIXATION: Type: IMPLANTABLE DEVICE | Site: SPINE LUMBAR | Status: FUNCTIONAL

## 2024-11-08 DEVICE — SCREW SPNL L40MM DIA6.5MM POST THORACOLUMBOSACRAL MOD SHANK: Type: IMPLANTABLE DEVICE | Site: SPINE LUMBAR | Status: FUNCTIONAL

## 2024-11-08 DEVICE — SCREW SPNL MOD TULIP RELINE: Type: IMPLANTABLE DEVICE | Site: SPINE LUMBAR | Status: FUNCTIONAL

## 2024-11-08 DEVICE — SCREW SPNL L45MM DIA6.5MM POST THORACOLUMBOSACRAL MOD SHANK: Type: IMPLANTABLE DEVICE | Site: SPINE LUMBAR | Status: FUNCTIONAL

## 2024-11-08 RX ORDER — NALOXONE HYDROCHLORIDE 0.4 MG/ML
INJECTION, SOLUTION INTRAMUSCULAR; INTRAVENOUS; SUBCUTANEOUS PRN
Status: DISCONTINUED | OUTPATIENT
Start: 2024-11-08 | End: 2024-11-08 | Stop reason: HOSPADM

## 2024-11-08 RX ORDER — HALOPERIDOL 5 MG/ML
1 INJECTION INTRAMUSCULAR
Status: DISCONTINUED | OUTPATIENT
Start: 2024-11-08 | End: 2024-11-08 | Stop reason: HOSPADM

## 2024-11-08 RX ORDER — AMLODIPINE BESYLATE 5 MG/1
10 TABLET ORAL DAILY
Status: DISCONTINUED | OUTPATIENT
Start: 2024-11-09 | End: 2024-11-13 | Stop reason: HOSPADM

## 2024-11-08 RX ORDER — SODIUM CHLORIDE 0.9 % (FLUSH) 0.9 %
5-40 SYRINGE (ML) INJECTION EVERY 12 HOURS SCHEDULED
Status: DISCONTINUED | OUTPATIENT
Start: 2024-11-08 | End: 2024-11-13 | Stop reason: HOSPADM

## 2024-11-08 RX ORDER — DIPHENHYDRAMINE HCL 25 MG
25 CAPSULE ORAL EVERY 6 HOURS PRN
Status: DISCONTINUED | OUTPATIENT
Start: 2024-11-08 | End: 2024-11-13 | Stop reason: HOSPADM

## 2024-11-08 RX ORDER — LABETALOL HYDROCHLORIDE 5 MG/ML
10 INJECTION, SOLUTION INTRAVENOUS
Status: DISCONTINUED | OUTPATIENT
Start: 2024-11-08 | End: 2024-11-08 | Stop reason: HOSPADM

## 2024-11-08 RX ORDER — SODIUM CHLORIDE 9 MG/ML
INJECTION, SOLUTION INTRAVENOUS PRN
Status: DISCONTINUED | OUTPATIENT
Start: 2024-11-08 | End: 2024-11-13 | Stop reason: HOSPADM

## 2024-11-08 RX ORDER — DEXAMETHASONE SODIUM PHOSPHATE 10 MG/ML
8 INJECTION INTRAMUSCULAR; INTRAVENOUS ONCE
Status: DISCONTINUED | OUTPATIENT
Start: 2024-11-08 | End: 2024-11-08 | Stop reason: HOSPADM

## 2024-11-08 RX ORDER — DEXMEDETOMIDINE HYDROCHLORIDE 100 UG/ML
INJECTION, SOLUTION INTRAVENOUS
Status: DISCONTINUED | OUTPATIENT
Start: 2024-11-08 | End: 2024-11-08 | Stop reason: SDUPTHER

## 2024-11-08 RX ORDER — MONTELUKAST SODIUM 10 MG/1
10 TABLET ORAL NIGHTLY
Status: DISCONTINUED | OUTPATIENT
Start: 2024-11-09 | End: 2024-11-13 | Stop reason: HOSPADM

## 2024-11-08 RX ORDER — DIPHENHYDRAMINE HYDROCHLORIDE 50 MG/ML
25 INJECTION INTRAMUSCULAR; INTRAVENOUS EVERY 6 HOURS PRN
Status: DISCONTINUED | OUTPATIENT
Start: 2024-11-08 | End: 2024-11-13 | Stop reason: HOSPADM

## 2024-11-08 RX ORDER — ACETAMINOPHEN 500 MG
1000 TABLET ORAL ONCE
Status: COMPLETED | OUTPATIENT
Start: 2024-11-08 | End: 2024-11-08

## 2024-11-08 RX ORDER — HYDROMORPHONE HYDROCHLORIDE 2 MG/ML
INJECTION, SOLUTION INTRAMUSCULAR; INTRAVENOUS; SUBCUTANEOUS
Status: DISCONTINUED | OUTPATIENT
Start: 2024-11-08 | End: 2024-11-08 | Stop reason: SDUPTHER

## 2024-11-08 RX ORDER — ENOXAPARIN SODIUM 100 MG/ML
40 INJECTION SUBCUTANEOUS DAILY
Status: DISCONTINUED | OUTPATIENT
Start: 2024-11-10 | End: 2024-11-13 | Stop reason: HOSPADM

## 2024-11-08 RX ORDER — BISACODYL 5 MG/1
5 TABLET, DELAYED RELEASE ORAL DAILY
Status: DISCONTINUED | OUTPATIENT
Start: 2024-11-09 | End: 2024-11-13 | Stop reason: HOSPADM

## 2024-11-08 RX ORDER — ONDANSETRON 2 MG/ML
INJECTION INTRAMUSCULAR; INTRAVENOUS
Status: DISCONTINUED | OUTPATIENT
Start: 2024-11-08 | End: 2024-11-08 | Stop reason: SDUPTHER

## 2024-11-08 RX ORDER — HYDROCHLOROTHIAZIDE 25 MG/1
25 TABLET ORAL DAILY
Status: DISCONTINUED | OUTPATIENT
Start: 2024-11-09 | End: 2024-11-13 | Stop reason: HOSPADM

## 2024-11-08 RX ORDER — OXYCODONE HYDROCHLORIDE 5 MG/1
5 TABLET ORAL PRN
Status: DISCONTINUED | OUTPATIENT
Start: 2024-11-08 | End: 2024-11-08 | Stop reason: HOSPADM

## 2024-11-08 RX ORDER — PROPOFOL 10 MG/ML
INJECTION, EMULSION INTRAVENOUS
Status: DISCONTINUED | OUTPATIENT
Start: 2024-11-08 | End: 2024-11-08 | Stop reason: SDUPTHER

## 2024-11-08 RX ORDER — LIDOCAINE HYDROCHLORIDE ANHYDROUS AND DEXTROSE MONOHYDRATE 5; 400 G/100ML; MG/100ML
INJECTION, SOLUTION INTRAVENOUS
Status: DISCONTINUED | OUTPATIENT
Start: 2024-11-08 | End: 2024-11-08 | Stop reason: SDUPTHER

## 2024-11-08 RX ORDER — LOSARTAN POTASSIUM 50 MG/1
50 TABLET ORAL DAILY
Status: DISCONTINUED | OUTPATIENT
Start: 2024-11-09 | End: 2024-11-13 | Stop reason: HOSPADM

## 2024-11-08 RX ORDER — MIDAZOLAM HYDROCHLORIDE 2 MG/2ML
2 INJECTION, SOLUTION INTRAMUSCULAR; INTRAVENOUS
Status: DISCONTINUED | OUTPATIENT
Start: 2024-11-08 | End: 2024-11-08 | Stop reason: HOSPADM

## 2024-11-08 RX ORDER — ACETAMINOPHEN 325 MG/1
650 TABLET ORAL EVERY 6 HOURS
Status: DISCONTINUED | OUTPATIENT
Start: 2024-11-08 | End: 2024-11-13 | Stop reason: HOSPADM

## 2024-11-08 RX ORDER — ROCURONIUM BROMIDE 10 MG/ML
INJECTION, SOLUTION INTRAVENOUS
Status: DISCONTINUED | OUTPATIENT
Start: 2024-11-08 | End: 2024-11-08 | Stop reason: SDUPTHER

## 2024-11-08 RX ORDER — CEFAZOLIN SODIUM 1 G/3ML
INJECTION, POWDER, FOR SOLUTION INTRAMUSCULAR; INTRAVENOUS
Status: DISCONTINUED | OUTPATIENT
Start: 2024-11-08 | End: 2024-11-08 | Stop reason: SDUPTHER

## 2024-11-08 RX ORDER — LIDOCAINE HYDROCHLORIDE 20 MG/ML
INJECTION, SOLUTION EPIDURAL; INFILTRATION; INTRACAUDAL; PERINEURAL
Status: DISCONTINUED | OUTPATIENT
Start: 2024-11-08 | End: 2024-11-08 | Stop reason: SDUPTHER

## 2024-11-08 RX ORDER — HYDRALAZINE HYDROCHLORIDE 20 MG/ML
10 INJECTION INTRAMUSCULAR; INTRAVENOUS
Status: DISCONTINUED | OUTPATIENT
Start: 2024-11-08 | End: 2024-11-08 | Stop reason: HOSPADM

## 2024-11-08 RX ORDER — KETAMINE HCL IN NACL, ISO-OSM 20 MG/2 ML
SYRINGE (ML) INJECTION
Status: DISCONTINUED | OUTPATIENT
Start: 2024-11-08 | End: 2024-11-08 | Stop reason: SDUPTHER

## 2024-11-08 RX ORDER — FENTANYL CITRATE 50 UG/ML
100 INJECTION, SOLUTION INTRAMUSCULAR; INTRAVENOUS
Status: DISCONTINUED | OUTPATIENT
Start: 2024-11-08 | End: 2024-11-08 | Stop reason: HOSPADM

## 2024-11-08 RX ORDER — ALBUMIN, HUMAN INJ 5% 5 %
SOLUTION INTRAVENOUS
Status: DISCONTINUED | OUTPATIENT
Start: 2024-11-08 | End: 2024-11-08 | Stop reason: SDUPTHER

## 2024-11-08 RX ORDER — SODIUM CHLORIDE 0.9 % (FLUSH) 0.9 %
5-40 SYRINGE (ML) INJECTION PRN
Status: DISCONTINUED | OUTPATIENT
Start: 2024-11-08 | End: 2024-11-13 | Stop reason: HOSPADM

## 2024-11-08 RX ORDER — ONDANSETRON 2 MG/ML
4 INJECTION INTRAMUSCULAR; INTRAVENOUS EVERY 6 HOURS PRN
Status: DISCONTINUED | OUTPATIENT
Start: 2024-11-08 | End: 2024-11-13 | Stop reason: HOSPADM

## 2024-11-08 RX ORDER — VANCOMYCIN HYDROCHLORIDE 1 G/20ML
INJECTION, POWDER, LYOPHILIZED, FOR SOLUTION INTRAVENOUS PRN
Status: DISCONTINUED | OUTPATIENT
Start: 2024-11-08 | End: 2024-11-08 | Stop reason: ALTCHOICE

## 2024-11-08 RX ORDER — HYDROCODONE BITARTRATE AND ACETAMINOPHEN 10; 325 MG/1; MG/1
1 TABLET ORAL EVERY 6 HOURS PRN
Status: DISCONTINUED | OUTPATIENT
Start: 2024-11-08 | End: 2024-11-09

## 2024-11-08 RX ORDER — POLYETHYLENE GLYCOL 3350 17 G/17G
17 POWDER, FOR SOLUTION ORAL DAILY
Status: DISCONTINUED | OUTPATIENT
Start: 2024-11-09 | End: 2024-11-13 | Stop reason: HOSPADM

## 2024-11-08 RX ORDER — LIDOCAINE HYDROCHLORIDE 10 MG/ML
1 INJECTION, SOLUTION INFILTRATION; PERINEURAL
Status: DISCONTINUED | OUTPATIENT
Start: 2024-11-08 | End: 2024-11-08 | Stop reason: HOSPADM

## 2024-11-08 RX ORDER — IPRATROPIUM BROMIDE AND ALBUTEROL SULFATE 2.5; .5 MG/3ML; MG/3ML
1 SOLUTION RESPIRATORY (INHALATION)
Status: DISCONTINUED | OUTPATIENT
Start: 2024-11-08 | End: 2024-11-08 | Stop reason: HOSPADM

## 2024-11-08 RX ORDER — SCOLOPAMINE TRANSDERMAL SYSTEM 1 MG/1
1 PATCH, EXTENDED RELEASE TRANSDERMAL
Status: COMPLETED | OUTPATIENT
Start: 2024-11-08 | End: 2024-11-11

## 2024-11-08 RX ORDER — SODIUM CHLORIDE 0.9 % (FLUSH) 0.9 %
5-40 SYRINGE (ML) INJECTION EVERY 12 HOURS SCHEDULED
Status: DISCONTINUED | OUTPATIENT
Start: 2024-11-08 | End: 2024-11-08 | Stop reason: HOSPADM

## 2024-11-08 RX ORDER — PROMETHAZINE HYDROCHLORIDE 12.5 MG/1
12.5 TABLET ORAL EVERY 6 HOURS PRN
Status: DISCONTINUED | OUTPATIENT
Start: 2024-11-08 | End: 2024-11-13 | Stop reason: HOSPADM

## 2024-11-08 RX ORDER — OXYCODONE HYDROCHLORIDE 5 MG/1
10 TABLET ORAL PRN
Status: DISCONTINUED | OUTPATIENT
Start: 2024-11-08 | End: 2024-11-08 | Stop reason: HOSPADM

## 2024-11-08 RX ORDER — SODIUM CHLORIDE 0.9 % (FLUSH) 0.9 %
5-40 SYRINGE (ML) INJECTION PRN
Status: DISCONTINUED | OUTPATIENT
Start: 2024-11-08 | End: 2024-11-08 | Stop reason: HOSPADM

## 2024-11-08 RX ORDER — HYDROMORPHONE HYDROCHLORIDE 1 MG/ML
0.5 INJECTION, SOLUTION INTRAMUSCULAR; INTRAVENOUS; SUBCUTANEOUS
Status: DISCONTINUED | OUTPATIENT
Start: 2024-11-08 | End: 2024-11-13 | Stop reason: HOSPADM

## 2024-11-08 RX ORDER — SODIUM CHLORIDE 9 MG/ML
INJECTION, SOLUTION INTRAVENOUS CONTINUOUS
Status: ACTIVE | OUTPATIENT
Start: 2024-11-08 | End: 2024-11-09

## 2024-11-08 RX ORDER — DIPHENHYDRAMINE HYDROCHLORIDE 50 MG/ML
12.5 INJECTION INTRAMUSCULAR; INTRAVENOUS
Status: DISCONTINUED | OUTPATIENT
Start: 2024-11-08 | End: 2024-11-08 | Stop reason: HOSPADM

## 2024-11-08 RX ORDER — SODIUM CHLORIDE 9 MG/ML
INJECTION, SOLUTION INTRAVENOUS PRN
Status: DISCONTINUED | OUTPATIENT
Start: 2024-11-08 | End: 2024-11-08 | Stop reason: HOSPADM

## 2024-11-08 RX ORDER — PREGABALIN 75 MG/1
150 CAPSULE ORAL 2 TIMES DAILY
Status: DISCONTINUED | OUTPATIENT
Start: 2024-11-08 | End: 2024-11-13 | Stop reason: HOSPADM

## 2024-11-08 RX ORDER — METHOCARBAMOL 500 MG/1
1000 TABLET, FILM COATED ORAL EVERY 8 HOURS
Status: COMPLETED | OUTPATIENT
Start: 2024-11-08 | End: 2024-11-09

## 2024-11-08 RX ORDER — PROCHLORPERAZINE EDISYLATE 5 MG/ML
5 INJECTION INTRAMUSCULAR; INTRAVENOUS
Status: DISCONTINUED | OUTPATIENT
Start: 2024-11-08 | End: 2024-11-08 | Stop reason: HOSPADM

## 2024-11-08 RX ORDER — HYDROMORPHONE HYDROCHLORIDE 2 MG/ML
0.5 INJECTION, SOLUTION INTRAMUSCULAR; INTRAVENOUS; SUBCUTANEOUS EVERY 5 MIN PRN
Status: DISCONTINUED | OUTPATIENT
Start: 2024-11-08 | End: 2024-11-08 | Stop reason: HOSPADM

## 2024-11-08 RX ORDER — DEXAMETHASONE SODIUM PHOSPHATE 10 MG/ML
INJECTION INTRAMUSCULAR; INTRAVENOUS
Status: DISCONTINUED | OUTPATIENT
Start: 2024-11-08 | End: 2024-11-08 | Stop reason: SDUPTHER

## 2024-11-08 RX ORDER — SODIUM CHLORIDE, SODIUM LACTATE, POTASSIUM CHLORIDE, CALCIUM CHLORIDE 600; 310; 30; 20 MG/100ML; MG/100ML; MG/100ML; MG/100ML
INJECTION, SOLUTION INTRAVENOUS CONTINUOUS
Status: DISCONTINUED | OUTPATIENT
Start: 2024-11-08 | End: 2024-11-08 | Stop reason: HOSPADM

## 2024-11-08 RX ORDER — METHOCARBAMOL 100 MG/ML
1000 INJECTION, SOLUTION INTRAMUSCULAR; INTRAVENOUS ONCE
Status: COMPLETED | OUTPATIENT
Start: 2024-11-08 | End: 2024-11-08

## 2024-11-08 RX ORDER — LIDOCAINE HYDROCHLORIDE ANHYDROUS AND DEXTROSE MONOHYDRATE 5; 400 G/100ML; MG/100ML
1 INJECTION, SOLUTION INTRAVENOUS CONTINUOUS
Status: ACTIVE | OUTPATIENT
Start: 2024-11-08 | End: 2024-11-09

## 2024-11-08 RX ADMIN — HYDROMORPHONE HYDROCHLORIDE 0.2 MG: 2 INJECTION INTRAMUSCULAR; INTRAVENOUS; SUBCUTANEOUS at 20:51

## 2024-11-08 RX ADMIN — Medication 20 MG: at 16:57

## 2024-11-08 RX ADMIN — HYDROMORPHONE HYDROCHLORIDE 1 MG: 2 INJECTION INTRAMUSCULAR; INTRAVENOUS; SUBCUTANEOUS at 16:39

## 2024-11-08 RX ADMIN — ONDANSETRON 4 MG: 2 INJECTION INTRAMUSCULAR; INTRAVENOUS at 21:40

## 2024-11-08 RX ADMIN — LIDOCAINE HYDROCHLORIDE 100 MG: 20 INJECTION, SOLUTION EPIDURAL; INFILTRATION; INTRACAUDAL; PERINEURAL at 14:58

## 2024-11-08 RX ADMIN — TRANEXAMIC ACID 1000 MG: 100 INJECTION, SOLUTION INTRAVENOUS at 21:40

## 2024-11-08 RX ADMIN — PHENYLEPHRINE HYDROCHLORIDE 100 MCG: 10 INJECTION INTRAVENOUS at 17:58

## 2024-11-08 RX ADMIN — PROPOFOL 200 MG: 10 INJECTION, EMULSION INTRAVENOUS at 14:58

## 2024-11-08 RX ADMIN — SODIUM CHLORIDE 1500 MG: 9 INJECTION, SOLUTION INTRAVENOUS at 09:38

## 2024-11-08 RX ADMIN — ROCURONIUM BROMIDE 50 MG: 10 INJECTION, SOLUTION INTRAVENOUS at 15:15

## 2024-11-08 RX ADMIN — DEXMEDETOMIDINE 4 MCG: 100 INJECTION, SOLUTION, CONCENTRATE INTRAVENOUS at 21:23

## 2024-11-08 RX ADMIN — CEFAZOLIN 2 G: 1 INJECTION, POWDER, FOR SOLUTION INTRAMUSCULAR; INTRAVENOUS at 21:09

## 2024-11-08 RX ADMIN — SODIUM CHLORIDE, SODIUM LACTATE, POTASSIUM CHLORIDE, AND CALCIUM CHLORIDE: 600; 310; 30; 20 INJECTION, SOLUTION INTRAVENOUS at 16:01

## 2024-11-08 RX ADMIN — ROCURONIUM BROMIDE 20 MG: 10 INJECTION, SOLUTION INTRAVENOUS at 16:23

## 2024-11-08 RX ADMIN — ACETAMINOPHEN 1000 MG: 500 TABLET, FILM COATED ORAL at 09:24

## 2024-11-08 RX ADMIN — HYDROMORPHONE HYDROCHLORIDE 0.2 MG: 2 INJECTION INTRAMUSCULAR; INTRAVENOUS; SUBCUTANEOUS at 21:19

## 2024-11-08 RX ADMIN — METHOCARBAMOL 1000 MG: 100 INJECTION INTRAMUSCULAR; INTRAVENOUS at 22:09

## 2024-11-08 RX ADMIN — Medication 20 MG: at 16:24

## 2024-11-08 RX ADMIN — PROPOFOL 140 MCG/KG/MIN: 10 INJECTION, EMULSION INTRAVENOUS at 15:02

## 2024-11-08 RX ADMIN — SODIUM CHLORIDE, SODIUM LACTATE, POTASSIUM CHLORIDE, AND CALCIUM CHLORIDE: 600; 310; 30; 20 INJECTION, SOLUTION INTRAVENOUS at 19:37

## 2024-11-08 RX ADMIN — TRANEXAMIC ACID 1000 MG: 100 INJECTION, SOLUTION INTRAVENOUS at 15:15

## 2024-11-08 RX ADMIN — HYDROMORPHONE HYDROCHLORIDE 0.2 MG: 2 INJECTION INTRAMUSCULAR; INTRAVENOUS; SUBCUTANEOUS at 20:07

## 2024-11-08 RX ADMIN — Medication 40 MG: at 15:20

## 2024-11-08 RX ADMIN — ROCURONIUM BROMIDE 10 MG: 10 INJECTION, SOLUTION INTRAVENOUS at 18:07

## 2024-11-08 RX ADMIN — SODIUM CHLORIDE, SODIUM LACTATE, POTASSIUM CHLORIDE, AND CALCIUM CHLORIDE: 600; 310; 30; 20 INJECTION, SOLUTION INTRAVENOUS at 14:53

## 2024-11-08 RX ADMIN — ONDANSETRON 4 MG: 2 INJECTION INTRAMUSCULAR; INTRAVENOUS at 15:02

## 2024-11-08 RX ADMIN — DEXAMETHASONE SODIUM PHOSPHATE 10 MG: 10 INJECTION INTRAMUSCULAR; INTRAVENOUS at 15:02

## 2024-11-08 RX ADMIN — ALBUMIN (HUMAN) 25 G: 12.5 SOLUTION INTRAVENOUS at 19:40

## 2024-11-08 RX ADMIN — Medication 3 AMPULE: at 09:24

## 2024-11-08 RX ADMIN — SODIUM CHLORIDE 1500 MG: 9 INJECTION, SOLUTION INTRAVENOUS at 14:53

## 2024-11-08 RX ADMIN — HYDROMORPHONE HYDROCHLORIDE 1 MG: 2 INJECTION INTRAMUSCULAR; INTRAVENOUS; SUBCUTANEOUS at 14:53

## 2024-11-08 RX ADMIN — LIDOCAINE HYDROCHLORIDE 1 MG/KG/HR: 4 INJECTION, SOLUTION INTRAVENOUS at 15:02

## 2024-11-08 RX ADMIN — PHENYLEPHRINE HYDROCHLORIDE 20 MCG/MIN: 10 INJECTION INTRAVENOUS at 16:05

## 2024-11-08 ASSESSMENT — PAIN - FUNCTIONAL ASSESSMENT
PAIN_FUNCTIONAL_ASSESSMENT: NONE - DENIES PAIN
PAIN_FUNCTIONAL_ASSESSMENT: NONE - DENIES PAIN
PAIN_FUNCTIONAL_ASSESSMENT: 0-10

## 2024-11-08 NOTE — PERIOP NOTE
Patient and family informed that case prior to hers is still in progress. Will update her as the prior case closes or in an hour.

## 2024-11-08 NOTE — PROGRESS NOTES
Pre-Surgery Prayer. PT was in bed preparing for surgery. Spouse was at bedside. PT expressed importance of and comfort in esperanza and prayer. PT is Moravian. CH offered prayer. CH offered additional support if needed.    . DEANDRE Ashton.Div.

## 2024-11-08 NOTE — ANESTHESIA PRE PROCEDURE
for Itching    Jazzy Schneider MD   phentermine (ADIPEX-P) 37.5 MG tablet Take 1 tablet by mouth daily. 1/8/24   Jazzy Schneider MD   cetirizine (ZYRTEC) 10 MG tablet TAKE 1 TABLET (10 MG TOTAL) BY MOUTH IN THE MORNING  Patient not taking: Reported on 10/25/2024 10/24/23   Jazzy Schneider MD   Azelastine-Fluticasone 137-50 MCG/ACT SUSP by Nasal route as needed 12/8/20   Automatic Reconciliation, Ar       Current medications:    Current Facility-Administered Medications   Medication Dose Route Frequency Provider Last Rate Last Admin    lidocaine 1 % injection 1 mL  1 mL IntraDERmal Once PRN Perfecto Stratton MD        fentaNYL (SUBLIMAZE) injection 100 mcg  100 mcg IntraVENous Once PRN Perfecto Stratton MD        scopolamine (TRANSDERM-SCOP) transdermal patch 1 patch  1 patch TransDERmal Q72H Perfecto Stratton MD   1 patch at 11/08/24 0925    lactated ringers infusion   IntraVENous Continuous Perfecto Stratton MD        sodium chloride flush 0.9 % injection 5-40 mL  5-40 mL IntraVENous 2 times per day Perfecto Stratton MD        sodium chloride flush 0.9 % injection 5-40 mL  5-40 mL IntraVENous PRN Perfecto Stratton MD        0.9 % sodium chloride infusion   IntraVENous PRN Perfecto Stratton MD        midazolam PF (VERSED) injection 2 mg  2 mg IntraVENous Once PRN Perfecto Stratton MD        sodium chloride flush 0.9 % injection 5-40 mL  5-40 mL IntraVENous 2 times per day León Mcgrath MD        dexAMETHasone (DECADRON) injection 8 mg  8 mg IntraVENous Once León Mcgrath MD        tranexamic acid (CYKLOKAPRON) 1,000 mg in sodium chloride 0.9 % 100 mL IVPB  1,000 mg IntraVENous On Call to OR León Mcgrath MD           Allergies:    Allergies   Allergen Reactions    Latex Itching and Rash    Adhesive Tape Itching and Rash    Penicillins Hives, Itching and Swelling     Other Reaction(s): lips swelling    Pollen Extract Itching     Sneezing, pt has an allergist    Cat Hair Extract      Sneezing

## 2024-11-08 NOTE — H&P
[facility]        Neurosurgery History and Physical Clinic Note           Patient Name: Jenna Lopez     Medical Record Number: [id number]     YOB: 1975     Date of Visit: 10/07/24           Visit Type: New Patient           CHIEF COMPLAINT:        Chief Complaint   Patient presents with    Back Pain               HISTORY OF PRESENT ILLNESS:     I had the pleasure of meeting Jenna Lopez in the neurosurgical clinic today. Jenna Lopez is a pleasant 49 y.o. year young female who presented with complaints of chronic lower back pain and right greater than left leg pain.  Patient is well-known to our clinic.  Patient had a previous right L5-S1 hemilaminectomy and discectomy by Dr. Mckenzie in 2013.  Patient subsequently underwent a redo right L5-S1 laminectomy and discectomy for recurrent disc in 2014..  Patient continues to have pain so a spinal cord stimulator was placed in 2015 for postlaminectomy syndrome.  Unfortunately 3 months later the spinal cord stimulator was removed due to postop infection.  Patient continues to have low back pain and leg pain.  In 2022 patient had an EMG and nerve conduction study which showed evidence more consistent with chronic S1 radiculopathy without any peripheral neuropathy or left lower radiculopathy.  Patient underwent 2 more epidural injection and got some short-term relief but again pain returned.  Subsequent MRIs have showed complete collapse of the L5-S1 disc with some mild retrolisthesis at L5-S1.  Patient saw my partner Dr. Km Gibbs in 2022 and 2023 who felt patient was a surgical candidate however in 2023 patient had a BMI of 46.3.  Due to patient's high BMI patient was encouraged to go try to lose some weight before surgery is attempted given her previous history of infection and her risk for postoperative complication.  Patient returned today after having a gastric sleeve and reports her current  is to at least get some improvement.     I will obtain a preop standing scoliosis films CT lumbar spine for surgical planning.     -Lumbar ALIF: We discussed the details of the surgery including an incision made in the front of the belly. A general or vascular surgeon will assist to clear a path to the spine, moving aside the vessels to the legs. The spine surgeon will then identify the disk space radiographically and removes the damaged disk. The disk   The disk space would be distracted as much as possible and measured for the appropriate sized spacer cage.   This would be packed with allograft and likely bone marrow aspirate. The spacer restores the height between the bones, and relieves the pinched nerves. The graft becomes a bridge between the two bones to promote fusion. The graft is strengthened with a metal plate and screws. The abdominal structures would be inspected, and wound would then be closed by the general or vascular surgeon and covered with sterile dressings. Most patients will have a second incision made posteriorly for supplemental rods and screws.  I will also perform a complete laminectomy and bilateral foraminotomy to decompress exiting and traversing nerve root.  She would expect to stay in the hospital 2-3 days or until she is able to tolerate a normal diet, has return of bowel function, and can get about safely with minimal assistance.  Follow-up would be scheduled for 2 weeks and there will be restrictions including no driving, and no lifting greater than 15 lbs until follow up with me. She was encouraged to walk as much as possible before and after the operation to facilitate an expeditious recovery.    We also discussed the potential risks of the surgery including, but not limited to infection, spinal fluid leak and potential headaches requiring her to remain supine post-operatively; injury to the cauda equina or peripheral nerve root resulting in weakness, numbness, or very rarely bowel

## 2024-11-09 ENCOUNTER — APPOINTMENT (OUTPATIENT)
Dept: GENERAL RADIOLOGY | Age: 49
DRG: 402 | End: 2024-11-09
Attending: STUDENT IN AN ORGANIZED HEALTH CARE EDUCATION/TRAINING PROGRAM
Payer: COMMERCIAL

## 2024-11-09 PROBLEM — M47.9 SPONDYLOSIS: Status: ACTIVE | Noted: 2024-11-09

## 2024-11-09 LAB
ANION GAP SERPL CALC-SCNC: 9 MMOL/L (ref 7–16)
BUN SERPL-MCNC: 8 MG/DL (ref 6–23)
CALCIUM SERPL-MCNC: 9 MG/DL (ref 8.8–10.2)
CHLORIDE SERPL-SCNC: 107 MMOL/L (ref 98–107)
CO2 SERPL-SCNC: 25 MMOL/L (ref 20–29)
CREAT SERPL-MCNC: 0.81 MG/DL (ref 0.6–1.1)
ERYTHROCYTE [DISTWIDTH] IN BLOOD BY AUTOMATED COUNT: 14.1 % (ref 11.9–14.6)
GLUCOSE SERPL-MCNC: 137 MG/DL (ref 70–99)
HCT VFR BLD AUTO: 37.8 % (ref 35.8–46.3)
HGB BLD-MCNC: 12.7 G/DL (ref 11.7–15.4)
MCH RBC QN AUTO: 26.6 PG (ref 26.1–32.9)
MCHC RBC AUTO-ENTMCNC: 33.6 G/DL (ref 31.4–35)
MCV RBC AUTO: 79.1 FL (ref 82–102)
NRBC # BLD: 0 K/UL (ref 0–0.2)
PLATELET # BLD AUTO: 229 K/UL (ref 150–450)
PMV BLD AUTO: 11.3 FL (ref 9.4–12.3)
POTASSIUM SERPL-SCNC: 4.4 MMOL/L (ref 3.5–5.1)
RBC # BLD AUTO: 4.78 M/UL (ref 4.05–5.2)
SODIUM SERPL-SCNC: 142 MMOL/L (ref 136–145)
WBC # BLD AUTO: 12 K/UL (ref 4.3–11.1)

## 2024-11-09 PROCEDURE — 85027 COMPLETE CBC AUTOMATED: CPT

## 2024-11-09 PROCEDURE — 97161 PT EVAL LOW COMPLEX 20 MIN: CPT

## 2024-11-09 PROCEDURE — 2580000003 HC RX 258: Performed by: STUDENT IN AN ORGANIZED HEALTH CARE EDUCATION/TRAINING PROGRAM

## 2024-11-09 PROCEDURE — 36415 COLL VENOUS BLD VENIPUNCTURE: CPT

## 2024-11-09 PROCEDURE — 2500000003 HC RX 250 WO HCPCS: Performed by: STUDENT IN AN ORGANIZED HEALTH CARE EDUCATION/TRAINING PROGRAM

## 2024-11-09 PROCEDURE — 97530 THERAPEUTIC ACTIVITIES: CPT

## 2024-11-09 PROCEDURE — 72100 X-RAY EXAM L-S SPINE 2/3 VWS: CPT

## 2024-11-09 PROCEDURE — 99024 POSTOP FOLLOW-UP VISIT: CPT | Performed by: STUDENT IN AN ORGANIZED HEALTH CARE EDUCATION/TRAINING PROGRAM

## 2024-11-09 PROCEDURE — 1100000000 HC RM PRIVATE

## 2024-11-09 PROCEDURE — 80048 BASIC METABOLIC PNL TOTAL CA: CPT

## 2024-11-09 PROCEDURE — 6360000002 HC RX W HCPCS: Performed by: STUDENT IN AN ORGANIZED HEALTH CARE EDUCATION/TRAINING PROGRAM

## 2024-11-09 PROCEDURE — 6370000000 HC RX 637 (ALT 250 FOR IP): Performed by: STUDENT IN AN ORGANIZED HEALTH CARE EDUCATION/TRAINING PROGRAM

## 2024-11-09 RX ORDER — HYDROCODONE BITARTRATE AND ACETAMINOPHEN 10; 325 MG/1; MG/1
1 TABLET ORAL EVERY 6 HOURS
Status: DISCONTINUED | OUTPATIENT
Start: 2024-11-09 | End: 2024-11-13 | Stop reason: HOSPADM

## 2024-11-09 RX ADMIN — SODIUM CHLORIDE, PRESERVATIVE FREE 10 ML: 5 INJECTION INTRAVENOUS at 21:06

## 2024-11-09 RX ADMIN — DIPHENHYDRAMINE HYDROCHLORIDE 25 MG: 50 INJECTION INTRAMUSCULAR; INTRAVENOUS at 21:05

## 2024-11-09 RX ADMIN — LOSARTAN POTASSIUM 50 MG: 50 TABLET, FILM COATED ORAL at 09:28

## 2024-11-09 RX ADMIN — ACETAMINOPHEN 650 MG: 325 TABLET ORAL at 09:40

## 2024-11-09 RX ADMIN — SODIUM CHLORIDE 1500 MG: 9 INJECTION, SOLUTION INTRAVENOUS at 14:38

## 2024-11-09 RX ADMIN — HYDROCODONE BITARTRATE AND ACETAMINOPHEN 1 TABLET: 10; 325 TABLET ORAL at 02:50

## 2024-11-09 RX ADMIN — DIPHENHYDRAMINE HYDROCHLORIDE 25 MG: 25 CAPSULE ORAL at 14:32

## 2024-11-09 RX ADMIN — MONTELUKAST 10 MG: 10 TABLET, FILM COATED ORAL at 00:49

## 2024-11-09 RX ADMIN — BISACODYL 5 MG: 5 TABLET, COATED ORAL at 09:28

## 2024-11-09 RX ADMIN — METHOCARBAMOL TABLETS 1000 MG: 500 TABLET, COATED ORAL at 00:49

## 2024-11-09 RX ADMIN — PREGABALIN 150 MG: 75 CAPSULE ORAL at 09:28

## 2024-11-09 RX ADMIN — HYDROCHLOROTHIAZIDE 25 MG: 25 TABLET ORAL at 09:28

## 2024-11-09 RX ADMIN — HYDROMORPHONE HYDROCHLORIDE 0.5 MG: 1 INJECTION, SOLUTION INTRAMUSCULAR; INTRAVENOUS; SUBCUTANEOUS at 09:31

## 2024-11-09 RX ADMIN — SODIUM CHLORIDE, PRESERVATIVE FREE 10 ML: 5 INJECTION INTRAVENOUS at 09:34

## 2024-11-09 RX ADMIN — PREGABALIN 150 MG: 75 CAPSULE ORAL at 00:49

## 2024-11-09 RX ADMIN — AMLODIPINE BESYLATE 10 MG: 5 TABLET ORAL at 09:28

## 2024-11-09 RX ADMIN — HYDROCODONE BITARTRATE AND ACETAMINOPHEN 1 TABLET: 10; 325 TABLET ORAL at 11:26

## 2024-11-09 RX ADMIN — MONTELUKAST 10 MG: 10 TABLET, FILM COATED ORAL at 21:05

## 2024-11-09 RX ADMIN — METHOCARBAMOL TABLETS 1000 MG: 500 TABLET, COATED ORAL at 05:51

## 2024-11-09 RX ADMIN — ACETAMINOPHEN 650 MG: 325 TABLET ORAL at 15:20

## 2024-11-09 RX ADMIN — ACETAMINOPHEN 650 MG: 325 TABLET ORAL at 00:49

## 2024-11-09 RX ADMIN — PREGABALIN 150 MG: 75 CAPSULE ORAL at 21:05

## 2024-11-09 RX ADMIN — SODIUM CHLORIDE: 9 INJECTION, SOLUTION INTRAVENOUS at 00:48

## 2024-11-09 RX ADMIN — ACETAMINOPHEN 650 MG: 325 TABLET ORAL at 21:06

## 2024-11-09 RX ADMIN — HYDROCODONE BITARTRATE AND ACETAMINOPHEN 1 TABLET: 10; 325 TABLET ORAL at 17:28

## 2024-11-09 RX ADMIN — POLYETHYLENE GLYCOL 3350 17 G: 17 POWDER, FOR SOLUTION ORAL at 09:28

## 2024-11-09 RX ADMIN — METHOCARBAMOL TABLETS 1000 MG: 500 TABLET, COATED ORAL at 15:16

## 2024-11-09 ASSESSMENT — PAIN DESCRIPTION - DESCRIPTORS
DESCRIPTORS: ACHING
DESCRIPTORS: ACHING;DISCOMFORT
DESCRIPTORS: ACHING
DESCRIPTORS: ACHING

## 2024-11-09 ASSESSMENT — PAIN SCALES - GENERAL
PAINLEVEL_OUTOF10: 7
PAINLEVEL_OUTOF10: 6
PAINLEVEL_OUTOF10: 0
PAINLEVEL_OUTOF10: 8
PAINLEVEL_OUTOF10: 1
PAINLEVEL_OUTOF10: 8
PAINLEVEL_OUTOF10: 2

## 2024-11-09 ASSESSMENT — PAIN DESCRIPTION - LOCATION
LOCATION: BACK

## 2024-11-09 ASSESSMENT — PAIN DESCRIPTION - ONSET
ONSET: ON-GOING
ONSET: ON-GOING

## 2024-11-09 ASSESSMENT — PAIN DESCRIPTION - FREQUENCY
FREQUENCY: INTERMITTENT
FREQUENCY: INTERMITTENT

## 2024-11-09 ASSESSMENT — PAIN - FUNCTIONAL ASSESSMENT
PAIN_FUNCTIONAL_ASSESSMENT: ACTIVITIES ARE NOT PREVENTED
PAIN_FUNCTIONAL_ASSESSMENT: ACTIVITIES ARE NOT PREVENTED
PAIN_FUNCTIONAL_ASSESSMENT: PREVENTS OR INTERFERES SOME ACTIVE ACTIVITIES AND ADLS
PAIN_FUNCTIONAL_ASSESSMENT: ACTIVITIES ARE NOT PREVENTED

## 2024-11-09 ASSESSMENT — PAIN DESCRIPTION - ORIENTATION
ORIENTATION: ANTERIOR;POSTERIOR
ORIENTATION: LOWER

## 2024-11-09 ASSESSMENT — PAIN DESCRIPTION - PAIN TYPE
TYPE: SURGICAL PAIN
TYPE: SURGICAL PAIN

## 2024-11-09 NOTE — PROGRESS NOTES
ACUTE PHYSICAL THERAPY GOALS:   (Developed with and agreed upon by patient and/or caregiver.)    (1.) Jenna Lopez  will move from supine to sit and sit to supine  with STAND BY ASSIST within 7 treatment day(s).    (2.) Jenna Lopez will transfer from bed to chair and chair to bed with STAND BY ASSIST using the least restrictive device within 7 treatment day(s).    (3.) Jenna Lopez will ambulate with STAND BY ASSIST for 150 feet with the least restrictive device within 7 treatment day(s).   (4.) Jenna Lopez will perform standing static and dynamic balance activities x 10 minutes with STAND BY ASSIST to improve safety within 7 treatment day(s).  (5.) Jenna Lopez will perform therapeutic exercises x 10 min for HEP with SUPERVISION to improve strength, endurance, and functional mobility within 7 treatment day(s).      PHYSICAL THERAPY Initial Assessment, Daily Note, and AM  (Link to Caseload Tracking: PT Visit Days : 1  Acknowledge Orders  Time In/Out  PT Charge Capture  Rehab Caseload Tracker    Jenna Lopez is a 49 y.o. female   PRIMARY DIAGNOSIS: Spondylosis  Spondylosis, unspecified [M47.9]  Spondylolisthesis, lumbosacral region [M43.17]  Mechanical back pain [M54.9]  Spondylosis [M47.9]  Procedure(s) (LRB):  L5-S1 anterior lumbar interbody fusion, L5-S1 laminectomy and bilateral foraminotomy, and posterior spinal fusion with instrumentation. (N/A)  1 Day Post-Op  Reason for Referral: Generalized Muscle Weakness (M62.81)  Difficulty in walking, Not elsewhere classified (R26.2)  Low Back Pain (M54.5)  Inpatient: Payor: SIENANA / Plan: [employer] / Product Type: *No Product type* /     ASSESSMENT:     REHAB RECOMMENDATIONS:   Recommendation to date pending progress:  Setting:  Home Health Therapy    Equipment:    Rolling Walker     ASSESSMENT:  Ms. Lopez is a 49 year old female who presents to hospital secondary to above diagnosis. Pt  understanding    TIME IN/OUT:  Time In: 0828  Time Out: 0909  Minutes: 41    Parag Thomas, PT

## 2024-11-09 NOTE — PLAN OF CARE
Problem: Pain  Goal: Verbalizes/displays adequate comfort level or baseline comfort level  Outcome: Progressing  Flowsheets (Taken 11/9/2024 0005)  Verbalizes/displays adequate comfort level or baseline comfort level:   Encourage patient to monitor pain and request assistance   Assess pain using appropriate pain scale     Problem: Safety - Adult  Goal: Free from fall injury  Outcome: Progressing     Problem: Discharge Planning  Goal: Discharge to home or other facility with appropriate resources  Outcome: Progressing  Flowsheets (Taken 11/9/2024 0005)  Discharge to home or other facility with appropriate resources:   Identify barriers to discharge with patient and caregiver   Identify discharge learning needs (meds, wound care, etc)     Problem: ABCDS Injury Assessment  Goal: Absence of physical injury  Outcome: Progressing

## 2024-11-09 NOTE — PROGRESS NOTES
VANCO DAILY FOLLOW UP NOTE  [facility]   Pharmacy Pharmacokinetic Monitoring Service - Vancomycin    Consulting Provider: Alcides   Indication: surgical site  Target Concentration: Goal AUC/DEEPTI 400-600 mg*hr/L  Day of Therapy: 1  Additional Antimicrobials: none    Pertinent Laboratory Values:   Wt Readings from Last 1 Encounters:   11/09/24 86.6 kg (191 lb)     Temp Readings from Last 1 Encounters:   11/09/24 97.9 °F (36.6 °C) (Oral)     No results for input(s): \"BUN\", \"CREATININE\", \"WBC\", \"PROCAL\", \"LACACIDPL\", \"LACTA\", \"LCAD\", \"LACTSEPSIS\" in the last 72 hours.    Invalid input(s): \"PROCAT\", \"PCT\", \"LAC\", \"LACT\", \"LACPOC\"  Estimated Creatinine Clearance: 77 mL/min (based on SCr of 0.86 mg/dL).    No results found for: \"VANCOTROUGH\", \"VANCORANDOM\"    MRSA Nasal Swab: N/A. Non-respiratory infection    Assessment:  Date/Time Dose Concentration AUC         Note: Serum concentrations collected for AUC dosing may appear elevated if collected in close proximity to the dose administered, this is not necessarily an indication of toxicity    Plan:  Dosing recommendations based on Bayesian software  Start vancomycin 1500mg q24h.  Anticipated AUC of 424 and trough concentration of 10.2 at steady state  Renal labs as indicated   Vancomycin concentrations will be ordered as clinically appropriate  Pharmacy will continue to monitor patient and adjust therapy as indicated    Thank you for the consult,  Nathalia Ceja [facility]

## 2024-11-09 NOTE — PROGRESS NOTES
ACUTE PHYSICAL THERAPY GOALS:   (Developed with and agreed upon by patient and/or caregiver.)  (1.) Jenna Lopez  will move from supine to sit and sit to supine  with STAND BY ASSIST within 7 treatment day(s).    (2.) Jenna Lopez will transfer from bed to chair and chair to bed with STAND BY ASSIST using the least restrictive device within 7 treatment day(s).    (3.) Jenna Lopez will ambulate with STAND BY ASSIST for 150 feet with the least restrictive device within 7 treatment day(s).   (4.) Jenna Lopez will perform standing static and dynamic balance activities x 10 minutes with STAND BY ASSIST to improve safety within 7 treatment day(s).  (5.) Jenna Lopez will perform therapeutic exercises x 10 min for HEP with SUPERVISION to improve strength, endurance, and functional mobility within 7 treatment day(s).    PHYSICAL THERAPY: Daily Note PM   (Link to Caseload Tracking: PT Visit Days : 1  Time In/Out PT Charge Capture  Rehab Caseload Tracker  Orders    Jenna Lopez is a 49 y.o. female   PRIMARY DIAGNOSIS: Spondylosis  Spondylosis, unspecified [M47.9]  Spondylolisthesis, lumbosacral region [M43.17]  Mechanical back pain [M54.9]  Spondylosis [M47.9]  Procedure(s) (LRB):  L5-S1 anterior lumbar interbody fusion, L5-S1 laminectomy and bilateral foraminotomy, and posterior spinal fusion with instrumentation. (N/A)  1 Day Post-Op  Inpatient: Payor: SAM / Plan: [employer] / Product Type: *No Product type* /     ASSESSMENT:     REHAB RECOMMENDATIONS:   Recommendation to date pending progress:  Setting:  Home Health Therapy    Equipment:    Rolling Walker     ASSESSMENT:  Ms. Lopez presents sitting up in chair, states that she is feeling good. This afternoon, pt able to perform STS from chair to RW with Min A secondary to decreased strength, pt then able to ambulate in hallway 75' with RW with SBA, pt required extra time to perform act, was  Inquiring with Reynaldo.   Standing Dynamic [] [x] [x] [] [] []      GAIT: I Mod I S SBA CGA Min Mod Max Total  NT x2 Comments:   Level of Assistance [] [] [] [x] [x] [] [] [] [] [] []    Distance 75  feet    DME Rolling Walker    Gait Quality Decreased yo  and Decreased step length    Weightbearing Status      Stairs      I=Independent, Mod I=Modified Independent, S=Supervision, SBA=Standby Assistance, CGA=Contact Guard Assistance,   Min=Minimal Assistance, Mod=Moderate Assistance, Max=Maximal Assistance, Total=Total Assistance, NT=Not Tested    PLAN:   FREQUENCY AND DURATION: BID for duration of hospital stay or until stated goals are met, whichever comes first.    TREATMENT:   TREATMENT:   Therapeutic Activity (16 Minutes): Therapeutic activity included Transfer Training, Ambulation on level ground, Sitting balance , and Standing balance to improve functional Activity tolerance, Balance, Mobility, and Strength.    TREATMENT GRID:  N/A    AFTER TREATMENT PRECAUTIONS: Alarm Activated, Bed/Chair Locked, Call light within reach, Chair, Needs within reach, RN notified, and Visitors at bedside    INTERDISCIPLINARY COLLABORATION:  RN/ PCT    EDUCATION: Education Given To: Patient;Family  Education Provided: Role of Therapy;Plan of Care;Precautions;Transfer Training;Equipment;Fall Prevention Strategies  Education Method: Verbal  Barriers to Learning: None  Education Outcome: Verbalized understanding    TIME IN/OUT:  Time In: 1349  Time Out: 1405  Minutes: 16    Parag Thomas PT

## 2024-11-09 NOTE — PROGRESS NOTES
VANCO DAILY FOLLOW UP NOTE  [facility]   Pharmacy Pharmacokinetic Monitoring Service - Vancomycin    Consulting Provider: Alcides   Indication: surgical site  Target Concentration: Goal AUC/DEEPTI 400-600 mg*hr/L  Day of Therapy: 2  Additional Antimicrobials: none    Pertinent Laboratory Values:   Wt Readings from Last 1 Encounters:   11/09/24 86.6 kg (191 lb)     Temp Readings from Last 1 Encounters:   11/09/24 98.2 °F (36.8 °C) (Oral)     Recent Labs     11/09/24  0520   BUN 8   CREATININE 0.81   WBC 12.0*     Estimated Creatinine Clearance: 82 mL/min (based on SCr of 0.81 mg/dL).    No results found for: \"VANCOTROUGH\", \"VANCORANDOM\"    MRSA Nasal Swab: N/A. Non-respiratory infection    Assessment:  Date/Time Dose Concentration AUC         Note: Serum concentrations collected for AUC dosing may appear elevated if collected in close proximity to the dose administered, this is not necessarily an indication of toxicity    Plan:  Dosing recommendations based on Bayesian software  Will continue the current vancomycin dose of 1500 mg IV every 24 hours  Anticipated AUC of 479 and trough concentration of 11.2 at steady state  Renal labs as indicated   Vancomycin concentrations will be ordered as clinically appropriate  Pharmacy will continue to monitor patient and adjust therapy as indicated    Thank you for the consult,  Matt Singleton RPH

## 2024-11-09 NOTE — PROGRESS NOTES
[facility]    Assessment: Ms. Lopez is a 49 y.o. female 1 Day Post-Op from Procedure(s) (LRB):  L5-S1 anterior lumbar interbody fusion, L5-S1 laminectomy and bilateral foraminotomy, and posterior spinal fusion with instrumentation. (N/A).         Plan:  -Continue therapy  -Continue muscle relaxant  -Patient will likely be in the hospital till Monday  -Placement pending PT eval  -Lovenox for DVT prophylaxis tomorrow  -Patient will need a short course of antibiotics after discharge given previous history and high risk of postop infection      Subjective:  Postop day 1 from an L5-S1 ALIF with posterior spinal instrumentation      Objective:      Lower Extremity Right Left   Illiopsoas (L2,3) 5 5   Quadriceps (L3,4) 5 5   Dorsiflexion (L4,5) 5 5   EHL (L5,S1) 5 5   Gastrocnemius (S1,2) 5 5     Reflex Level Grade   Knee jerk L3-4 R: 2+  L:2+   Ankle jerk S1 R: 2+  L:2+     Incision: Clean, dry and intact  Drain: Not recorded      Vitals:    11/09/24 0816   BP: (!) 147/80   Pulse: 92   Resp: 15   Temp: 98.2 °F (36.8 °C)   SpO2: 95%         León Mcgrath MD

## 2024-11-09 NOTE — OP NOTE
Operative Note      Patient: Jenna Lopez  YOB: 1975  MRN: [id number]    Date of Procedure: 11/8/2024    Pre-Op Diagnosis Codes:      * Spondylosis, unspecified [M47.9]     * Spondylolisthesis, lumbosacral region [M43.17]     * Mechanical back pain [M54.9]    Post-Op Diagnosis: Same      Surgeon(s):  León Mcgrath MD Johnston, Jeffrey Thomas, MD    Assistant:   Surgical Assistant: Fredo Richardson         Procedure(s):  L5-S1 anterior lumbar interbody fusion, L5-S1 laminectomy and bilateral foraminotomy, and posterior spinal fusion with instrumentation.    Anterior  abdominal incision - (Performed as co-surgeon with Dr. Abhay Clayton)  1.  L5 - S1 anterior lumbar arthrodesis (CPT 30074-74)   2. Insertion biomechanical device L5 through S1 (CPT 38652)    Posterior incision - (Performed without a co-surgeon)  3. Lumbar posterolateral fusion  L5 through S1 . (CPT 42620)  4. Instrumentation  L5 through S1 . (CPT 62199)  5. Allograft (CPT 67864)    ANESTHESIA: General.    ESTIMATED BLOOD LOSS: 200 ml    POSTOPERATIVE CONDITION: Stable.    INTRAOPERATIVE COMPLICATIONS: None.    IMPLANTS:   Implant Name Type Inv. Item Serial No.  Lot No. LRB No. Used Action   GRAFT BONE SUB 5ML PUTTY CA PHOS SYNTH GRAN BIOABSRB ATTRAX - [id number]  GRAFT BONE SUB 5ML PUTTY CA PHOS SYNTH GRAN BIOABSRB ATTRAX  [employer] [id number] N/A 1 Implanted   CAGE SPNL 15 DEG [id number] MM ALIF MODULUS - [id number]  CAGE SPNL 15 DEG [id number] MM ALIF MODULUS  [employer] [id number] N/A 1 Implanted   CAGE FUSION MODULUS ALIF BOLT 5.0X20MM FIXATION - [id number]  CAGE FUSION MODULUS ALIF BOLT 5.0X20MM FIXATION  [employer] [id number] N/A 1 Implanted   BOLT SPNL 5X15 MM ALIF FIX MODULUS - [id number]  BOLT SPNL 5X15 MM ALIF FIX MODULUS  [employer] [id number] N/A 1 Implanted   GRAFT BONE SUB 5ML PUTTY CA PHOS SYNTH GRAN BIOABSRB ATTRAX - [id number]  GRAFT BONE SUB 5ML PUTTY CA PHOS SYNTH GRAN  tolerances have diminished. Imaging studies are concordant, showing lumbar spondylosis particularly at L5-S1. In the outpatient setting, the risks, benefits, and potential complications of the above listed procedure were discussed and an informed consent was obtained.    DESCRIPTION OF PROCEDURE:     Anterior incision:     After adequate induction of general anesthesia, the patient was positioned supine on the OSI table. Care was taken to pad all bony prominences. Preoperative antibiotics were administered. A time out was called after the patient's abdomen was prepped and draped in the usual sterile fashion. At this point, Dr. Clayton performed the anterior approach to the L5-S1 disc space.  The approach is dictated separately by Dr. Clayton, the co-surgeon for the anterior procedure.  After exposure of the anterior L5-S1 disc space, a spinal needle was inserted into the disc space and a lateral radiograph was obtained to confirm appropriate spinal level.  With this confirmation, a 15 blade was used to perform an annulotomy of L5-S1.  A complete disc removal was performed using a series of curettes and Dudley elevators. Once all disk material was removed and endplates were roughened,  the sizing trials were inserted and increased in size until there was a good fit. The trial implant was inserted and visualized fluoroscopically in both AP and lateral planes and was felt to be satisfactory. The interbody cage device was then selected. Allograft was inserted into the cage.  The interbody cage was then impacted under fluoroscopic visualization to be in the central portion of the disk space and inserted posteriorly to within several millimeters of the posterior margin of the vertebral body. This was felt to be satisfactory with radiographic imaging. With this, the abdominal layers and wound were approximated in a layered fashion under the direction of Dr. Clayton.    Posterior incision:    The patient was then

## 2024-11-09 NOTE — PERIOP NOTE
TRANSFER - OUT REPORT:    Verbal report given to Jazz LEE on Jenna Lopez  being transferred to [facility] for routine progression of patient care       Report consisted of patient’s Situation, Background, Assessment and   Recommendations(SBAR).     Information from the following report(s) Nurse Handoff Report, Adult Overview, Surgery Report, Intake/Output, MAR, Recent Results, Med Rec Status, Cardiac Rhythm Sinus Tachy to NSR, Alarm Parameters, Procedure Verification, Quality Measures, Neuro Assessment, and Event Log was reviewed with the receiving nurse.    Lines:   Peripheral IV 11/08/24 Posterior;Right Hand (Active)   Site Assessment Clean, dry & intact 11/08/24 2324   Line Status Flushed;Infusing 11/08/24 2324   Line Care Connections checked and tightened 11/08/24 2324   Phlebitis Assessment No symptoms 11/08/24 2324   Infiltration Assessment 0 11/08/24 2324   Dressing Status Clean, dry & intact 11/08/24 2324   Dressing Type Transparent 11/08/24 2324       Peripheral IV 11/08/24 Right Antecubital (Active)        Opportunity for questions and clarification was provided.      Patient transported with:   O2 @ 2 liters    VTE prophylaxis orders have been written for Jenna Lopez.    Patient and family given floor number and nurses name.  Family updated re: pt status after security code verified.

## 2024-11-10 LAB — VANCOMYCIN SERPL-MCNC: 8.5 UG/ML

## 2024-11-10 PROCEDURE — 97535 SELF CARE MNGMENT TRAINING: CPT

## 2024-11-10 PROCEDURE — 1100000000 HC RM PRIVATE

## 2024-11-10 PROCEDURE — 97530 THERAPEUTIC ACTIVITIES: CPT

## 2024-11-10 PROCEDURE — 97112 NEUROMUSCULAR REEDUCATION: CPT

## 2024-11-10 PROCEDURE — 99024 POSTOP FOLLOW-UP VISIT: CPT | Performed by: STUDENT IN AN ORGANIZED HEALTH CARE EDUCATION/TRAINING PROGRAM

## 2024-11-10 PROCEDURE — 6370000000 HC RX 637 (ALT 250 FOR IP): Performed by: STUDENT IN AN ORGANIZED HEALTH CARE EDUCATION/TRAINING PROGRAM

## 2024-11-10 PROCEDURE — 6360000002 HC RX W HCPCS: Performed by: STUDENT IN AN ORGANIZED HEALTH CARE EDUCATION/TRAINING PROGRAM

## 2024-11-10 PROCEDURE — 97165 OT EVAL LOW COMPLEX 30 MIN: CPT

## 2024-11-10 PROCEDURE — 36415 COLL VENOUS BLD VENIPUNCTURE: CPT

## 2024-11-10 PROCEDURE — 2580000003 HC RX 258: Performed by: STUDENT IN AN ORGANIZED HEALTH CARE EDUCATION/TRAINING PROGRAM

## 2024-11-10 PROCEDURE — 80202 ASSAY OF VANCOMYCIN: CPT

## 2024-11-10 PROCEDURE — 97110 THERAPEUTIC EXERCISES: CPT

## 2024-11-10 RX ORDER — TRAMADOL HYDROCHLORIDE 50 MG/1
50 TABLET ORAL EVERY 6 HOURS PRN
Status: DISCONTINUED | OUTPATIENT
Start: 2024-11-10 | End: 2024-11-13 | Stop reason: HOSPADM

## 2024-11-10 RX ORDER — METHOCARBAMOL 750 MG/1
750 TABLET, FILM COATED ORAL 4 TIMES DAILY
Status: DISCONTINUED | OUTPATIENT
Start: 2024-11-10 | End: 2024-11-13 | Stop reason: HOSPADM

## 2024-11-10 RX ADMIN — HYDROCODONE BITARTRATE AND ACETAMINOPHEN 1 TABLET: 10; 325 TABLET ORAL at 21:23

## 2024-11-10 RX ADMIN — ACETAMINOPHEN 650 MG: 325 TABLET ORAL at 05:25

## 2024-11-10 RX ADMIN — HYDROCODONE BITARTRATE AND ACETAMINOPHEN 1 TABLET: 10; 325 TABLET ORAL at 16:40

## 2024-11-10 RX ADMIN — ENOXAPARIN SODIUM 40 MG: 100 INJECTION SUBCUTANEOUS at 09:29

## 2024-11-10 RX ADMIN — SODIUM CHLORIDE, PRESERVATIVE FREE 10 ML: 5 INJECTION INTRAVENOUS at 21:23

## 2024-11-10 RX ADMIN — MONTELUKAST 10 MG: 10 TABLET, FILM COATED ORAL at 21:22

## 2024-11-10 RX ADMIN — METHOCARBAMOL TABLETS 750 MG: 750 TABLET, COATED ORAL at 16:40

## 2024-11-10 RX ADMIN — SODIUM CHLORIDE, PRESERVATIVE FREE 5 ML: 5 INJECTION INTRAVENOUS at 09:00

## 2024-11-10 RX ADMIN — VANCOMYCIN HYDROCHLORIDE 750 MG: 750 INJECTION, POWDER, LYOPHILIZED, FOR SOLUTION INTRAVENOUS at 18:28

## 2024-11-10 RX ADMIN — VANCOMYCIN HYDROCHLORIDE 750 MG: 750 INJECTION, POWDER, LYOPHILIZED, FOR SOLUTION INTRAVENOUS at 10:56

## 2024-11-10 RX ADMIN — HYDROCODONE BITARTRATE AND ACETAMINOPHEN 1 TABLET: 10; 325 TABLET ORAL at 05:24

## 2024-11-10 RX ADMIN — POLYETHYLENE GLYCOL 3350 17 G: 17 POWDER, FOR SOLUTION ORAL at 09:29

## 2024-11-10 RX ADMIN — ACETAMINOPHEN 650 MG: 325 TABLET ORAL at 21:23

## 2024-11-10 RX ADMIN — PREGABALIN 150 MG: 75 CAPSULE ORAL at 21:22

## 2024-11-10 RX ADMIN — BISACODYL 5 MG: 5 TABLET, COATED ORAL at 09:30

## 2024-11-10 RX ADMIN — HYDROCODONE BITARTRATE AND ACETAMINOPHEN 1 TABLET: 10; 325 TABLET ORAL at 10:16

## 2024-11-10 RX ADMIN — LOSARTAN POTASSIUM 50 MG: 50 TABLET, FILM COATED ORAL at 09:30

## 2024-11-10 RX ADMIN — METHOCARBAMOL TABLETS 750 MG: 750 TABLET, COATED ORAL at 21:22

## 2024-11-10 RX ADMIN — HYDROCHLOROTHIAZIDE 25 MG: 25 TABLET ORAL at 09:30

## 2024-11-10 RX ADMIN — PREGABALIN 150 MG: 75 CAPSULE ORAL at 09:29

## 2024-11-10 RX ADMIN — TRAMADOL HYDROCHLORIDE 50 MG: 50 TABLET ORAL at 15:09

## 2024-11-10 RX ADMIN — HYDROCODONE BITARTRATE AND ACETAMINOPHEN 1 TABLET: 10; 325 TABLET ORAL at 01:22

## 2024-11-10 RX ADMIN — AMLODIPINE BESYLATE 10 MG: 5 TABLET ORAL at 09:30

## 2024-11-10 RX ADMIN — METHOCARBAMOL TABLETS 750 MG: 750 TABLET, COATED ORAL at 12:44

## 2024-11-10 RX ADMIN — DIPHENHYDRAMINE HYDROCHLORIDE 25 MG: 50 INJECTION INTRAMUSCULAR; INTRAVENOUS at 15:10

## 2024-11-10 RX ADMIN — ACETAMINOPHEN 650 MG: 325 TABLET ORAL at 15:09

## 2024-11-10 ASSESSMENT — PAIN - FUNCTIONAL ASSESSMENT
PAIN_FUNCTIONAL_ASSESSMENT: ACTIVITIES ARE NOT PREVENTED

## 2024-11-10 ASSESSMENT — PAIN DESCRIPTION - ORIENTATION
ORIENTATION: LOWER

## 2024-11-10 ASSESSMENT — PAIN DESCRIPTION - DESCRIPTORS
DESCRIPTORS: ACHING;BURNING;DISCOMFORT;THROBBING
DESCRIPTORS: ACHING
DESCRIPTORS: ACHING
DESCRIPTORS: ACHING;BURNING;DISCOMFORT
DESCRIPTORS: ACHING;BURNING;DISCOMFORT

## 2024-11-10 ASSESSMENT — PAIN SCALES - GENERAL
PAINLEVEL_OUTOF10: 6
PAINLEVEL_OUTOF10: 0
PAINLEVEL_OUTOF10: 0
PAINLEVEL_OUTOF10: 6
PAINLEVEL_OUTOF10: 7
PAINLEVEL_OUTOF10: 3
PAINLEVEL_OUTOF10: 3
PAINLEVEL_OUTOF10: 5
PAINLEVEL_OUTOF10: 6
PAINLEVEL_OUTOF10: 7
PAINLEVEL_OUTOF10: 3
PAINLEVEL_OUTOF10: 6

## 2024-11-10 ASSESSMENT — PAIN DESCRIPTION - PAIN TYPE: TYPE: SURGICAL PAIN

## 2024-11-10 ASSESSMENT — PAIN DESCRIPTION - LOCATION
LOCATION: BACK

## 2024-11-10 ASSESSMENT — PAIN DESCRIPTION - ONSET: ONSET: ON-GOING

## 2024-11-10 ASSESSMENT — PAIN DESCRIPTION - FREQUENCY: FREQUENCY: INTERMITTENT

## 2024-11-10 NOTE — PROGRESS NOTES
[facility]    Assessment: Ms. Lopez is a 49 y.o. female 2 Days Post-Op from Procedure(s) (LRB):  L5-S1 anterior lumbar interbody fusion, L5-S1 laminectomy and bilateral foraminotomy, and posterior spinal fusion with instrumentation. (N/A).         Plan:  -Continue therapy  - Continue drain  -PT recommend home health  -Lovenox for DVT prophylaxis tomorrow  -Patient will need a short course of antibiotics after discharge given previous history and high risk of postop infection  -Plan for discharge on Monday or Tuesday      Subjective:  Walked at 120 feet with PT yesterday      Objective:      Lower Extremity Right Left   Illiopsoas (L2,3) 5 5   Quadriceps (L3,4) 5 5   Dorsiflexion (L4,5) 5 5   EHL (L5,S1) 5 5   Gastrocnemius (S1,2) 5 5     Reflex Level Grade   Knee jerk L3-4 R: 2+  L:2+   Ankle jerk S1 R: 2+  L:2+     Incision: Clean, dry and intact  Drain: 90 cc      Vitals:    11/10/24 0700   BP: 120/71   Pulse: 85   Resp: 16   Temp: 98.9 °F (37.2 °C)   SpO2: 96%         León Mcgrath MD

## 2024-11-10 NOTE — PROGRESS NOTES
VANCO DAILY FOLLOW UP NOTE  [facility]   Pharmacy Pharmacokinetic Monitoring Service - Vancomycin    Consulting Provider: Alcides   Indication: surgical site  Target Concentration: Goal AUC/DEEPTI 400-600 mg*hr/L  Day of Therapy: 3  Additional Antimicrobials: none    Pertinent Laboratory Values:   Wt Readings from Last 1 Encounters:   11/09/24 86.6 kg (191 lb)     Temp Readings from Last 1 Encounters:   11/10/24 98.9 °F (37.2 °C) (Oral)     Recent Labs     11/09/24  0520   BUN 8   CREATININE 0.81   WBC 12.0*     Estimated Creatinine Clearance: 82 mL/min (based on SCr of 0.81 mg/dL).    Lab Results   Component Value Date/Time    VANCORANDOM 8.5 11/10/2024 03:53 AM       MRSA Nasal Swab: N/A. Non-respiratory infection    Assessment:  Date/Time Dose Concentration AUC   11/10 11364 1500 mg q24h 8.5 304   Note: Serum concentrations collected for AUC dosing may appear elevated if collected in close proximity to the dose administered, this is not necessarily an indication of toxicity    Plan:  Current dosing regimen is sub-therapeutic  Increase dose to 750 mg q8h for predicted AUC/Tr of 456  Repeat vancomycin concentrations will be ordered as clinically appropriate   Pharmacy will continue to monitor patient and adjust therapy as indicated    Thank you for the consult,  Anderson Keen, PharmD, BCOP  Clinical Pharmacist  [employer]

## 2024-11-10 NOTE — PLAN OF CARE
Problem: Pain  Goal: Verbalizes/displays adequate comfort level or baseline comfort level  11/9/2024 2237 by Ese Rayo RN  Outcome: Progressing  11/9/2024 1238 by Winter Murcia RN  Outcome: Progressing     Problem: Safety - Adult  Goal: Free from fall injury  11/9/2024 2237 by Ese Rayo RN  Outcome: Progressing  Flowsheets (Taken 11/9/2024 2000)  Free From Fall Injury: Instruct family/caregiver on patient safety  11/9/2024 1238 by Winter Murcia RN  Outcome: Progressing     Problem: Discharge Planning  Goal: Discharge to home or other facility with appropriate resources  11/9/2024 2237 by Ese Rayo RN  Outcome: Progressing  Flowsheets (Taken 11/9/2024 2000)  Discharge to home or other facility with appropriate resources:   Identify barriers to discharge with patient and caregiver   Arrange for needed discharge resources and transportation as appropriate  11/9/2024 1238 by Winter Murcia RN  Outcome: Progressing     Problem: ABCDS Injury Assessment  Goal: Absence of physical injury  11/9/2024 2237 by Ese Rayo RN  Outcome: Progressing  11/9/2024 1238 by Winter Murcia RN  Outcome: Progressing

## 2024-11-10 NOTE — PROGRESS NOTES
ACUTE PHYSICAL THERAPY GOALS:   (Developed with and agreed upon by patient and/or caregiver.)  (1.) Jenna Lopez  will move from supine to sit and sit to supine  with STAND BY ASSIST within 7 treatment day(s).    (2.) Jenna Lopez will transfer from bed to chair and chair to bed with STAND BY ASSIST using the least restrictive device within 7 treatment day(s).    (3.) Jenna Lopez will ambulate with STAND BY ASSIST for 150 feet with the least restrictive device within 7 treatment day(s).   (4.) Jenna Lopez will perform standing static and dynamic balance activities x 10 minutes with STAND BY ASSIST to improve safety within 7 treatment day(s).  (5.) Jnena Lopez will perform therapeutic exercises x 10 min for HEP with SUPERVISION to improve strength, endurance, and functional mobility within 7 treatment day(s).    PHYSICAL THERAPY: Daily Note AM   (Link to Caseload Tracking: PT Visit Days : 2  Time In/Out PT Charge Capture  Rehab Caseload Tracker  Orders    Jenna Lopez is a 49 y.o. female   PRIMARY DIAGNOSIS: Spondylosis  Spondylosis, unspecified [M47.9]  Spondylolisthesis, lumbosacral region [M43.17]  Mechanical back pain [M54.9]  Spondylosis [M47.9]  Procedure(s) (LRB):  L5-S1 anterior lumbar interbody fusion, L5-S1 laminectomy and bilateral foraminotomy, and posterior spinal fusion with instrumentation. (N/A)  2 Days Post-Op  Inpatient: Payor: SAM / Plan: [employer] / Product Type: *No Product type* /     ASSESSMENT:     REHAB RECOMMENDATIONS:   Recommendation to date pending progress:  Setting:  Home Health Therapy    Equipment:    Rolling Walker     ASSESSMENT:  Ms. Lopez presents supine in bed, states that she is doing good. This morning, pt able to perform sup to sit from bed to EOB with SBA , required extra time to perform task, pt then able to perform STS to RW with CGA secondary for safety, pt then able to ambulate within

## 2024-11-10 NOTE — PROGRESS NOTES
ACUTE PHYSICAL THERAPY GOALS:   (Developed with and agreed upon by patient and/or caregiver.)  (1.) Jenna Lopez  will move from supine to sit and sit to supine  with STAND BY ASSIST within 7 treatment day(s).    (2.) Jenna Lopez will transfer from bed to chair and chair to bed with STAND BY ASSIST using the least restrictive device within 7 treatment day(s).    (3.) Jenna Lopez will ambulate with STAND BY ASSIST for 150 feet with the least restrictive device within 7 treatment day(s).   (4.) Jenna Lopez will perform standing static and dynamic balance activities x 10 minutes with STAND BY ASSIST to improve safety within 7 treatment day(s).  (5.) Jenna Lopez will perform therapeutic exercises x 10 min for HEP with SUPERVISION to improve strength, endurance, and functional mobility within 7 treatment day(s).    PHYSICAL THERAPY: Daily Note PM   (Link to Caseload Tracking: PT Visit Days : 2  Time In/Out PT Charge Capture  Rehab Caseload Tracker  Orders    Jenna Lopez is a 49 y.o. female   PRIMARY DIAGNOSIS: Spondylosis  Spondylosis, unspecified [M47.9]  Spondylolisthesis, lumbosacral region [M43.17]  Mechanical back pain [M54.9]  Spondylosis [M47.9]  Procedure(s) (LRB):  L5-S1 anterior lumbar interbody fusion, L5-S1 laminectomy and bilateral foraminotomy, and posterior spinal fusion with instrumentation. (N/A)  2 Days Post-Op  Inpatient: Payor: SAM / Plan: [employer] / Product Type: *No Product type* /     ASSESSMENT:     REHAB RECOMMENDATIONS:   Recommendation to date pending progress:  Setting:  Home Health Therapy    Equipment:    Rolling Walker     ASSESSMENT:  Ms. Lopez presents sitting up in chair, family in room. This afternoon, pt able to perform STS from chair to RW with SBA for safety, pt then able to ambulate in hallway 120' with RW with SBA, pt required extra time to perform act, was able to return to room and returned  back up to chair,  pt then able to perform LE seated ex's, pt left in chair, Family in room, pt cont to make progress, will continue to follow     SUBJECTIVE:   Ms. Lopez states, \"Not sure when they are gonna take this drain out\"     Social/Functional Lives With: Family  Type of Home: House  Home Layout: One level  Home Access: Level entry  Bathroom Shower/Tub: Walk-in shower  Bathroom Toilet: Standard  Home Equipment: None  Has the patient had two or more falls in the past year or any fall with injury in the past year?: No  OBJECTIVE:     PAIN: VITALS / O2: PRECAUTION / LINES / DRAINS:   Pre Treatment:   Pain Assessment: 0-10  Pain Level: 6  Pain Location: Back  Pain Orientation: Lower  Non-Pharmaceutical Pain Intervention(s): Ambulation/Increased Activity  Response to Pain Intervention: Patient satisfied      Post Treatment: none stated Vitals        Oxygen    [place] Drain    RESTRICTIONS/PRECAUTIONS:  Position Activity Restriction  Spinal Precautions: No Lifting, No Bending, No Twisting  Spinal Precautions: Log rolling  Sternal Precautions:  (15# lifting restriction per chart)     MOBILITY: I Mod I S SBA CGA Min Mod Max Total  NT x2 Comments:   Bed Mobility    Rolling [] [] [] [] [] [] [] [] [] [] []    Supine to Sit [] [] [] [] [] [] [] [] [] [x] []    Scooting [] [] [] [] [] [] [] [] [] [] []    Sit to Supine [] [] [] [] [] [] [] [] [] [] []    Transfers    Sit to Stand [] [] [] [x] [x] [] [] [] [] [] []    Bed to Chair [] [] [] [] [] [] [] [] [] [] []    Stand to Sit [] [] [] [x] [] [] [] [] [] [] []     [] [] [] [] [] [] [] [] [] [] []    I=Independent, Mod I=Modified Independent, S=Supervision, SBA=Standby Assistance, CGA=Contact Guard Assistance,   Min=Minimal Assistance, Mod=Moderate Assistance, Max=Maximal Assistance, Total=Total Assistance, NT=Not Tested    BALANCE: Good Fair+ Fair Fair- Poor NT Comments   Sitting Static [] [x] [] [] [] []    Sitting Dynamic [] [x] [] [] [] []              Standing

## 2024-11-10 NOTE — PROGRESS NOTES
ACUTE OCCUPATIONAL THERAPY GOALS:   (Developed with and agreed upon by patient and/or caregiver.)  1. Patient will complete lower body bathing and dressing with MOD I and adaptive equipment as needed.   2.Patient will complete upper body bathing and dressing with MOD I and adaptive equipment as needed.  3. Patient will complete toileting with MOD I.   4. Patient will tolerate 30 minutes of OT treatment with 1-2 rest breaks to increase activity tolerance for ADLs.   5. Patient will complete functional transfers with MOD I and adaptive equipment as needed.   6. Patient will complete functional activity with MOD I and adaptive equipment as needed.  7. Patient will complete simple grooming task standing at the sink with MOD I.    Timeframe: 7 visits      OCCUPATIONAL THERAPY Initial Assessment and Daily Note       OT Visit Days: 1  Acknowledge Orders  Time  OT Charge Capture  Rehab Caseload Tracker      Jenna Lopez is a 49 y.o. female   PRIMARY DIAGNOSIS: Spondylosis  Spondylosis, unspecified [M47.9]  Spondylolisthesis, lumbosacral region [M43.17]  Mechanical back pain [M54.9]  Spondylosis [M47.9]  Procedure(s) (LRB):  L5-S1 anterior lumbar interbody fusion, L5-S1 laminectomy and bilateral foraminotomy, and posterior spinal fusion with instrumentation. (N/A)  2 Days Post-Op  Reason for Referral: Generalized Muscle Weakness (M62.81)  Other lack of cordination (R27.8)  Difficulty in walking, Not elsewhere classified (R26.2)  Inpatient: Payor: SIENANA / Plan: [employer] / Product Type: *No Product type* /     ASSESSMENT:     REHAB RECOMMENDATIONS:   Recommendation to date pending progress:  Setting:  Home Health Therapy    Equipment:    To Be Determined     ASSESSMENT:  Ms. Lopez presented to the hospital for elective spine procedure listed above. At baseline, pt is mod I for bADLs, mobility, and IADLs.   Today, pt was received supine in bed. Completed bed mobility, functional transfers, ambulation with

## 2024-11-11 PROCEDURE — 6370000000 HC RX 637 (ALT 250 FOR IP): Performed by: STUDENT IN AN ORGANIZED HEALTH CARE EDUCATION/TRAINING PROGRAM

## 2024-11-11 PROCEDURE — 2580000003 HC RX 258: Performed by: STUDENT IN AN ORGANIZED HEALTH CARE EDUCATION/TRAINING PROGRAM

## 2024-11-11 PROCEDURE — 97530 THERAPEUTIC ACTIVITIES: CPT

## 2024-11-11 PROCEDURE — 99024 POSTOP FOLLOW-UP VISIT: CPT | Performed by: NURSE PRACTITIONER

## 2024-11-11 PROCEDURE — 97535 SELF CARE MNGMENT TRAINING: CPT

## 2024-11-11 PROCEDURE — 1100000000 HC RM PRIVATE

## 2024-11-11 PROCEDURE — 6360000002 HC RX W HCPCS: Performed by: STUDENT IN AN ORGANIZED HEALTH CARE EDUCATION/TRAINING PROGRAM

## 2024-11-11 RX ADMIN — SODIUM CHLORIDE, PRESERVATIVE FREE 10 ML: 5 INJECTION INTRAVENOUS at 07:46

## 2024-11-11 RX ADMIN — PREGABALIN 150 MG: 75 CAPSULE ORAL at 07:43

## 2024-11-11 RX ADMIN — HYDROCODONE BITARTRATE AND ACETAMINOPHEN 1 TABLET: 10; 325 TABLET ORAL at 04:19

## 2024-11-11 RX ADMIN — METHOCARBAMOL TABLETS 750 MG: 750 TABLET, COATED ORAL at 12:15

## 2024-11-11 RX ADMIN — METHOCARBAMOL TABLETS 750 MG: 750 TABLET, COATED ORAL at 16:33

## 2024-11-11 RX ADMIN — DIPHENHYDRAMINE HYDROCHLORIDE 25 MG: 25 CAPSULE ORAL at 07:45

## 2024-11-11 RX ADMIN — PREGABALIN 150 MG: 75 CAPSULE ORAL at 21:21

## 2024-11-11 RX ADMIN — ENOXAPARIN SODIUM 40 MG: 100 INJECTION SUBCUTANEOUS at 07:42

## 2024-11-11 RX ADMIN — SODIUM CHLORIDE, PRESERVATIVE FREE 5 ML: 5 INJECTION INTRAVENOUS at 21:27

## 2024-11-11 RX ADMIN — VANCOMYCIN HYDROCHLORIDE 750 MG: 750 INJECTION, POWDER, LYOPHILIZED, FOR SOLUTION INTRAVENOUS at 17:56

## 2024-11-11 RX ADMIN — HYDROCHLOROTHIAZIDE 25 MG: 25 TABLET ORAL at 07:45

## 2024-11-11 RX ADMIN — AMLODIPINE BESYLATE 10 MG: 5 TABLET ORAL at 07:44

## 2024-11-11 RX ADMIN — LOSARTAN POTASSIUM 50 MG: 50 TABLET, FILM COATED ORAL at 07:44

## 2024-11-11 RX ADMIN — METHOCARBAMOL TABLETS 750 MG: 750 TABLET, COATED ORAL at 07:44

## 2024-11-11 RX ADMIN — MONTELUKAST 10 MG: 10 TABLET, FILM COATED ORAL at 21:21

## 2024-11-11 RX ADMIN — METHOCARBAMOL TABLETS 750 MG: 750 TABLET, COATED ORAL at 21:21

## 2024-11-11 RX ADMIN — BISACODYL 5 MG: 5 TABLET, COATED ORAL at 07:44

## 2024-11-11 RX ADMIN — ACETAMINOPHEN 650 MG: 325 TABLET ORAL at 10:33

## 2024-11-11 RX ADMIN — HYDROCODONE BITARTRATE AND ACETAMINOPHEN 1 TABLET: 10; 325 TABLET ORAL at 21:27

## 2024-11-11 RX ADMIN — VANCOMYCIN HYDROCHLORIDE 750 MG: 750 INJECTION, POWDER, LYOPHILIZED, FOR SOLUTION INTRAVENOUS at 01:39

## 2024-11-11 RX ADMIN — ACETAMINOPHEN 650 MG: 325 TABLET ORAL at 22:30

## 2024-11-11 RX ADMIN — TRAMADOL HYDROCHLORIDE 50 MG: 50 TABLET ORAL at 07:45

## 2024-11-11 RX ADMIN — HYDROCODONE BITARTRATE AND ACETAMINOPHEN 1 TABLET: 10; 325 TABLET ORAL at 16:33

## 2024-11-11 RX ADMIN — ACETAMINOPHEN 650 MG: 325 TABLET ORAL at 04:19

## 2024-11-11 RX ADMIN — HYDROCODONE BITARTRATE AND ACETAMINOPHEN 1 TABLET: 10; 325 TABLET ORAL at 12:15

## 2024-11-11 RX ADMIN — POLYETHYLENE GLYCOL 3350 17 G: 17 POWDER, FOR SOLUTION ORAL at 07:46

## 2024-11-11 RX ADMIN — VANCOMYCIN HYDROCHLORIDE 750 MG: 750 INJECTION, POWDER, LYOPHILIZED, FOR SOLUTION INTRAVENOUS at 10:34

## 2024-11-11 ASSESSMENT — PAIN DESCRIPTION - DESCRIPTORS: DESCRIPTORS: ACHING

## 2024-11-11 ASSESSMENT — PAIN SCALES - GENERAL
PAINLEVEL_OUTOF10: 7
PAINLEVEL_OUTOF10: 5
PAINLEVEL_OUTOF10: 7

## 2024-11-11 ASSESSMENT — PAIN DESCRIPTION - ORIENTATION: ORIENTATION: MID

## 2024-11-11 ASSESSMENT — PAIN DESCRIPTION - LOCATION
LOCATION: BACK
LOCATION: BACK

## 2024-11-11 ASSESSMENT — PAIN DESCRIPTION - ONSET: ONSET: ON-GOING

## 2024-11-11 ASSESSMENT — PAIN DESCRIPTION - FREQUENCY: FREQUENCY: CONTINUOUS

## 2024-11-11 ASSESSMENT — PAIN DESCRIPTION - PAIN TYPE: TYPE: SURGICAL PAIN

## 2024-11-11 ASSESSMENT — PAIN - FUNCTIONAL ASSESSMENT
PAIN_FUNCTIONAL_ASSESSMENT: ACTIVITIES ARE NOT PREVENTED
PAIN_FUNCTIONAL_ASSESSMENT: ACTIVITIES ARE NOT PREVENTED

## 2024-11-11 NOTE — PROGRESS NOTES
[facility]    Assessment: S/P L5-S1 fusion    POD#3    Plan:  -monitor drain output  -PT to mobilize  -LSO when UOOB  -if drain output today is low enough will remove drain and discharge tomorrow    Subjective:    Objective:  Afebrile  VSS  GCS15  Moving all extremities  Incision CDI  Drain output yesterday 70cc    Patient Vitals for the past 12 hrs:   Temp Pulse Resp BP SpO2   11/11/24 0744 -- -- 14 138/78 --   11/11/24 0713 98.2 °F (36.8 °C) 95 16 135/78 91 %        No results found for this or any previous visit (from the past 24 hour(s)).     Juliana Mckenzie, APRN - CNP

## 2024-11-11 NOTE — PLAN OF CARE
Problem: Pain  Goal: Verbalizes/displays adequate comfort level or baseline comfort level  11/10/2024 1938 by Ese Rayo RN  Outcome: Progressing  11/10/2024 0742 by Winter Murcia RN  Outcome: Progressing     Problem: Safety - Adult  Goal: Free from fall injury  11/10/2024 1938 by Ese Rayo RN  Outcome: Progressing  Flowsheets (Taken 11/10/2024 1928)  Free From Fall Injury: Instruct family/caregiver on patient safety  11/10/2024 0742 by Winter Murcia RN  Outcome: Progressing     Problem: Discharge Planning  Goal: Discharge to home or other facility with appropriate resources  11/10/2024 1938 by Ese Rayo RN  Outcome: Progressing  Flowsheets (Taken 11/10/2024 1929)  Discharge to home or other facility with appropriate resources:   Identify barriers to discharge with patient and caregiver   Arrange for needed discharge resources and transportation as appropriate  11/10/2024 0742 by Winter Murcia RN  Outcome: Progressing     Problem: ABCDS Injury Assessment  Goal: Absence of physical injury  11/10/2024 1938 by Ese Rayo RN  Outcome: Progressing  11/10/2024 0742 by Winter Murcia RN  Outcome: Progressing

## 2024-11-11 NOTE — PROGRESS NOTES
VANCO DAILY FOLLOW UP NOTE  [facility]   Pharmacy Pharmacokinetic Monitoring Service - Vancomycin    Consulting Provider: Alcides   Indication: surgical site  Target Concentration: Goal AUC/DEEPTI 400-600 mg*hr/L  Day of Therapy: 4  Additional Antimicrobials: none    Pertinent Laboratory Values:   Wt Readings from Last 1 Encounters:   11/09/24 86.6 kg (191 lb)     Temp Readings from Last 1 Encounters:   11/11/24 98.2 °F (36.8 °C) (Oral)     Recent Labs     11/09/24  0520   BUN 8   CREATININE 0.81   WBC 12.0*     Estimated Creatinine Clearance: 82 mL/min (based on SCr of 0.81 mg/dL).    Lab Results   Component Value Date/Time    VANCORANDOM 8.5 11/10/2024 03:53 AM       MRSA Nasal Swab: N/A. Non-respiratory infection    Assessment:  Date/Time Dose Concentration AUC   11/10 08850 1500 mg q24h 8.5 304   Note: Serum concentrations collected for AUC dosing may appear elevated if collected in close proximity to the dose administered, this is not necessarily an indication of toxicity    Plan:  Current dosing regimen is therapeutic  Continue 750 mg q8h.    Repeat vancomycin concentrations will be ordered as clinically appropriate   Pharmacy will continue to monitor patient and adjust therapy as indicated    Thank you for the consult,  Anderson Keen, PharmD, BCOP  Clinical Pharmacist  Contact [employer]

## 2024-11-11 NOTE — DISCHARGE INSTRUCTIONS
Discharge Instructions - Lumbar Surgery    Pain Medicine  Take pain medicine every 6hrs as needed.  You are encouraged to space out your pain pill doses until you are able wean off.  Pain medicine can cause constipation and upset stomach so it is advised to take with food.  It is encouraged to take a daily over the counter stool softer and drink plenty of water to help prevent constipation.  If you need a medication refill be aware it may take up to 3 days for a prescription to be called in.    Incision  Keep surgical site clean and dry.  You may use 4x4s (gauze) and skin tape when redressing incision    Please have someone check your incisions daily. If any of the following should occur, please call the office:   Drainage from incision site   Opening of incisions   Fevers greater than 101 degrees   Flu-like symptoms   Increased redness and/or tenderness  If you have staples or sutures instead of tape on your incision, they may be removed 10-14 days following your surgery. This may be done by a visiting nurse, rehab physician, or at your first follow up visit at our office. Otherwise, if your wound is covered with tape strips, they will typically fall of with showering.    Brace  If a brace is prescribed, wear it at when you up and out of bed.  Always wear a t-shirt under your brace so that it is not directly in contact with your bare skin.  The brace may cause you to sweat and you may feel warm. This can irritate your skin so pay special attention to the incision.    Showering  If your surgeon allows, you may shower as normal once the large, bulky bandages are removed from your incision. If they are not removed before your discharge from the hospital, you may remove them 36-48 hours after your surgery. Do not scrub at incisions but may allow soapy water to wash over them.  Pat them dry after showering and allow to fully dry before re-dressing. Hair washing is permissible while in the shower. No tub baths, hot  tubs or whirlpools until seen in the office. You may remove your brace for showering.    Exercise  Avoid the B,L,Ts (no bending, lifting, twisting, or overhead reaching greater than 5lbs until told by the doctor)  Lift only objects weighing less than 10-15 lbs.  Do not bend or twist at the waist. Always bend with your knees!  Limit your sitting to 20-30 minute intervals. You should lie down or walk in between sitting periods. There are no limitations for sitting in a recliner chair.   Walk as much as possible and let discomfort.    Pain  Take pain medicine as prescribed. As your pain level decreases, you may begin to take over-the-counter Extra-Strength Tylenol (you are medically safe to take 4,000mg every 24 hours). If you have had a fusion do NOT take any anti-inflammatory (Advil, Aleve, Motrin) medications for 10 weeks after surgery. Do not resume taking Fosamax for eight weeks after your fusion surgery.    Smoking/nicotine  Avoid the use of cigarettes or nicotine products during recovery as this slows wound healing causing an increased risk for infection.    Diabetes  If you are a diabetic maintaining stable healthy blood sugar levels is important to proper wound healing.    Blood Thinners (may not apply)  Be sure to discuss with the doctor when to restart your regular blood thinners.    Driving  You may NOT drive a car until told otherwise by your physician. You may be a passenger for short distances (about 20-30 minutes). If you must take a longer trip, be sure to make several pit stops so that you can walk and stretch your legs. Reclining in the passenger seat seems to be the most comfortable position for most patients.     Follow-Up Appointments  When you are discharged from the hospital, a follow up appointment will be made for 2-3 weeks from your surgery date. Call  to confirm your appointment.    If you need after office hours emergency medical attention concerning surgical site please go to the St

## 2024-11-11 NOTE — PROGRESS NOTES
ACUTE PHYSICAL THERAPY GOALS:   (Developed with and agreed upon by patient and/or caregiver.)  (1.) Jenna Lopez  will move from supine to sit and sit to supine  with STAND BY ASSIST within 7 treatment day(s).    (2.) Jenna Lopez will transfer from bed to chair and chair to bed with STAND BY ASSIST using the least restrictive device within 7 treatment day(s).    (3.) Jenna Lopez will ambulate with STAND BY ASSIST for 150 feet with the least restrictive device within 7 treatment day(s).   (4.) Jenna Lopez will perform standing static and dynamic balance activities x 10 minutes with STAND BY ASSIST to improve safety within 7 treatment day(s).  (5.) Jenna Lopez will perform therapeutic exercises x 10 min for HEP with SUPERVISION to improve strength, endurance, and functional mobility within 7 treatment day(s).    PHYSICAL THERAPY: Daily Note PM   (Link to Caseload Tracking: PT Visit Days : 3  Time In/Out PT Charge Capture  Rehab Caseload Tracker  Orders    Jenna Lopez is a 49 y.o. female   PRIMARY DIAGNOSIS: Spondylosis  Spondylosis, unspecified [M47.9]  Spondylolisthesis, lumbosacral region [M43.17]  Mechanical back pain [M54.9]  Spondylosis [M47.9]  Procedure(s) (LRB):  L5-S1 anterior lumbar interbody fusion, L5-S1 laminectomy and bilateral foraminotomy, and posterior spinal fusion with instrumentation. (N/A)  3 Days Post-Op  Inpatient: Payor: SAM / [facility] / Product Type: *No Product type* /     ASSESSMENT:     REHAB RECOMMENDATIONS:   Recommendation to date pending progress:  Setting:  Home Health Therapy    Equipment:    Rolling Walker     ASSESSMENT:  Ms. Lopez was sitting up in recliner chair upon contact and agreeable to PT. Orthotist delivered patient's lumbar corset. Educated patient on proper way to don/doff lumbar corset. Patient transferred to standing with SBA, additional time, and cues for  technique/hand placement. Once standing patient donned lumbar corset with mod assist and cues for technique. Patient then ambulated 130'  with rolling walker, SBA and occasional cues for sequencing with rolling walker. Patient returned to room where she ambulated ambulated in and out of bathroom 8' x 2 without use of assistive device with slow, shuffled gait noted. Patient transferred on and off toilet with SBA and cues for technique demonstrating good static standing balance during prolonged standing balance activities at the sink during functional tasks. Patient returned to recliner chair where she was repositioned for comfort. Patient able to recall 3/3 spinal precautions. Steady progress towards PT goals. Will continue PT efforts.     SUBJECTIVE:   Ms. Lopez states, \"I'll try to use the bathroom\"     Social/Functional Lives With: Family, Spouse  Type of Home: House  Home Layout: One level  Home Access: Level entry  Bathroom Shower/Tub: Walk-in shower  Bathroom Toilet: Standard  Home Equipment: None  Has the patient had two or more falls in the past year or any fall with injury in the past year?: No  ADL Assistance: Independent  Homemaking Assistance: Independent  Ambulation Assistance: Independent  Transfer Assistance: Independent  Occupation: On disability  OBJECTIVE:     PAIN: VITALS / O2: PRECAUTION / LINES / DRAINS:   Pre Treatment: Did not rate  Pain Assessment:  (Did not rate)      Post Treatment: none stated Vitals        Oxygen    Hemovac    RESTRICTIONS/PRECAUTIONS:  Position Activity Restriction  Spinal Precautions: No Lifting, No Bending, No Twisting  Spinal Precautions: Log rolling  Sternal Precautions:  (15# lifting restriction per chart)     MOBILITY: I Mod I S SBA CGA Min Mod Max Total  NT x2 Comments:   Bed Mobility    Rolling [] [] [] [] [] [] [] [] [] [] []    Supine to Sit [] [] [] [] [] [] [] [] [] [] []    Scooting [] [] [] [] [] [] [] [] [] [] []    Sit to Supine [] [] [] [] [] [] [] []

## 2024-11-11 NOTE — CARE COORDINATION
Chart reviewed by  for potential transition of care (LUIS ENRIQUE) needs or concerns.  Pt is insured with pharmacy benefits and is established with a PCP.  Therapy evals complete with the recommendation for HH therapy and a RW at [place].  CM met with pt to discuss these recommendations.  Demographics, insurance and PCP confirmed.  Pt is agreeable with HH at [place] and expressed no preference of HH agency. HH PT/OT referral submitted to [facility]. They have accepted the pt for services pending confirmation they are in network with pt's insurance plan. No other LUIS ENRIQUE needs or concerns identified or reported. Please notify/consult  if LUIS ENRIQUE needs arise.       11/11/24 1102   Service Assessment   Patient Orientation Alert and Oriented   Cognition Alert   History Provided By Patient;Medical Record   Primary Caregiver Self   Accompanied By/Relationship n/a   Support Systems Spouse/Significant Other;Family Members   Patient's Healthcare Decision Maker is: Legal Next of Kin   PCP Verified by CM Yes   Last Visit to PCP Within last 3 months  (10/11/2024)   Prior Functional Level Independent in ADLs/IADLs   Current Functional Level Assistance with the following:;Mobility   Can patient return to prior living arrangement Yes   Ability to make needs known: Good   Family able to assist with home care needs: Yes   Would you like for me to discuss the discharge plan with any other family members/significant others, and if so, who? No   Financial Resources Medicare;Other (Comment)  ([employer] commercial policy)   Community Resources None   Social/Functional History   Lives With Family;Spouse   Type of Home House   Home Layout One level   Home Access Level entry   Bathroom Shower/Tub Walk-in shower   Bathroom Toilet Standard   Home Equipment None   ADL Assistance Independent   Homemaking Assistance Independent   Ambulation Assistance Independent   Transfer Assistance Independent   Occupation On disability   Discharge Planning   Type of  Residence House   Living Arrangements Spouse/Significant Other;Family Members   Current Services Prior To Admission None   Potential Assistance Needed Home Care;Durable Medical Equipment   Potential DME Needed Walker   DME Ordered? Walker  (fixed wheel rolling walker from [facility])   Potential Assistance Purchasing Medications No   Type of Home Care Services PT;OT   Patient expects to be discharged to: House   Services At/After Discharge   Transition of Care Consult (CM Consult) Home Health;DME/Supply Assistance  (no CM consult received)   Internal Home Health No  ([facility])   Reason Outside Agency Chosen Managed care specific requirement   Services At/After Discharge Home Health;DME;PT;OT    Resource Information Provided? No   Mode of Transport at Discharge Other (see comment)  (family)   Confirm Follow Up Transport Family   Condition of Participation: Discharge Planning   The Plan for Transition of Care is related to the following treatment goals: Home health therapy services to support pt's care and recovery in the home s/p spine surgery.   The Patient and/or Patient Representative was provided with a Choice of Provider? Patient   The Patient and/Or Patient Representative agree with the Discharge Plan? Yes   Freedom of Choice list was provided with basic dialogue that supports the patient's individualized plan of care/goals, treatment preferences, and shares the quality data associated with the providers?  Yes

## 2024-11-11 NOTE — PROGRESS NOTES
ACUTE PHYSICAL THERAPY GOALS:   (Developed with and agreed upon by patient and/or caregiver.)  (1.) Jenna Lopez  will move from supine to sit and sit to supine  with STAND BY ASSIST within 7 treatment day(s).    (2.) Jenna Lopez will transfer from bed to chair and chair to bed with STAND BY ASSIST using the least restrictive device within 7 treatment day(s).    (3.) Jenna Lopez will ambulate with STAND BY ASSIST for 150 feet with the least restrictive device within 7 treatment day(s).   (4.) Jenna Lopez will perform standing static and dynamic balance activities x 10 minutes with STAND BY ASSIST to improve safety within 7 treatment day(s).  (5.) Jenna Lopez will perform therapeutic exercises x 10 min for HEP with SUPERVISION to improve strength, endurance, and functional mobility within 7 treatment day(s).    PHYSICAL THERAPY: Daily Note PM   (Link to Caseload Tracking: PT Visit Days : 3  Time In/Out PT Charge Capture  Rehab Caseload Tracker  Orders    Jenna Lopez is a 49 y.o. female   PRIMARY DIAGNOSIS: Spondylosis  Spondylosis, unspecified [M47.9]  Spondylolisthesis, lumbosacral region [M43.17]  Mechanical back pain [M54.9]  Spondylosis [M47.9]  Procedure(s) (LRB):  L5-S1 anterior lumbar interbody fusion, L5-S1 laminectomy and bilateral foraminotomy, and posterior spinal fusion with instrumentation. (N/A)  3 Days Post-Op  Inpatient: Payor: SAM / Plan: SAM [facility] HMO / Product Type: *No Product type* /     ASSESSMENT:     REHAB RECOMMENDATIONS:   Recommendation to date pending progress:  Setting:  Home Health Therapy    Equipment:    Rolling Walker     ASSESSMENT:  Ms. Lopez was supine upon contact and agreeable to PT. Patient performed supine to sit and transfer to standing with SBA, additional time, and cues for technique/hand placement. Once standing patient ambulated in and out of bathroom 8' x 2 without use of

## 2024-11-11 NOTE — PROGRESS NOTES
ACUTE OCCUPATIONAL THERAPY GOALS:   (Developed with and agreed upon by patient and/or caregiver.)  1. Patient will complete lower body bathing and dressing with MOD I and adaptive equipment as needed.   2.Patient will complete upper body bathing and dressing with MOD I and adaptive equipment as needed.  3. Patient will complete toileting with MOD I.   4. Patient will tolerate 30 minutes of OT treatment with 1-2 rest breaks to increase activity tolerance for ADLs.   5. Patient will complete functional transfers with MOD I and adaptive equipment as needed.   6. Patient will complete functional activity with MOD I and adaptive equipment as needed.  7. Patient will complete simple grooming task standing at the sink with MOD I.     Timeframe: 7 visits      OCCUPATIONAL THERAPY: Daily Note PM   OT Visit Days: 2   Time In/Out  OT Charge Capture  Rehab Caseload Tracker  OT Orders    Jenna Lopez is a 49 y.o. female   PRIMARY DIAGNOSIS: Spondylosis  Spondylosis, unspecified [M47.9]  Spondylolisthesis, lumbosacral region [M43.17]  Mechanical back pain [M54.9]  Spondylosis [M47.9]  Procedure(s) (LRB):  L5-S1 anterior lumbar interbody fusion, L5-S1 laminectomy and bilateral foraminotomy, and posterior spinal fusion with instrumentation. (N/A)  3 Days Post-Op  Inpatient: Payor: SAM / Plan: [facility] HMO / Product Type: *No Product type* /     ASSESSMENT:     REHAB RECOMMENDATIONS:   Recommendation to date pending progress:  Setting:  Home Health Therapy    Equipment:    To Be Determined     ASSESSMENT:  Ms. Lopez is doing well today. Pt presents sitting up in chair upon arrival. Pt was introduced to and practiced using AE to assist with bathing and dressing. Pt did well doffing socks with reacher. Instructed patient on where to purchase other equipment from. Making progress with goals. Will continue to benefit from skilled OT during stay.       SUBJECTIVE:     Ms. Lopez states, \"My niece just bought  [x]    Lower Body Dressing [] [] [] [] [] [x] [] [] [] []    Feeding [] [] [] [] [] [] [] [] [] [x]    Grooming [] [] [] [] [] [] [] [] [] [x]    Personal Device Care [] [] [] [] [] [] [] [] [] [x]    Functional Mobility [] [] [] [] [] [] [] [] [] [x]    I=Independent, Mod I=Modified Independent, S=Supervision/Setup, SBA=Standby Assistance, CGA=Contact Guard Assistance, Min=Minimal Assistance, Mod=Moderate Assistance, Max=Maximal Assistance, Total=Total Assistance, NT=Not Tested    BALANCE: Good Fair+ Fair Fair- Poor NT Comments   Sitting Static [] [x] [] [] [] []    Sitting Dynamic [] [x] [] [] [] []              Standing Static [] [] [] [] [] [x]    Standing Dynamic [] [] [] [] [] [x]        PLAN:     FREQUENCY/DURATION   OT Plan of Care: 5 times/week for duration of hospital stay or until stated goals are met, whichever comes first.    TREATMENT:     TREATMENT:   Self Care (13 minutes): Patient participated in lower body dressing ADLs in supported sitting with minimal verbal, manual, and tactile cueing to increase independence, decrease assistance required, and increase activity tolerance. Patient also participated in energy conservation, adaptive equipment, and compensatory technique training to increase independence, decrease assistance required, and increase activity tolerance. The patient was educated on recommended equipment and surgical precautions and patient and family verbalized understanding.     TREATMENT GRID:  N/A    AFTER TREATMENT PRECAUTIONS: Bed/Chair Locked, Call light within reach, Chair, and Needs within reach    INTERDISCIPLINARY COLLABORATION:  RN/ PCT and OT/ CABRERA    EDUCATION:       TOTAL TREATMENT DURATION AND TIME:  Time In: 1346  Time Out: 1359  Minutes: 13    TAYLA Quick

## 2024-11-11 NOTE — ANESTHESIA POSTPROCEDURE EVALUATION
Department of Anesthesiology  Postprocedure Note    Patient: Jenna Lopez  MRN: [id number]  YOB: 1975  Date of evaluation: 11/11/2024    Procedure Summary       Date: 11/08/24 Room / Location: [facility] MAIN OR  / [facility] MAIN OR    Anesthesia Start: 1453 Anesthesia Stop: 2239    Procedure: L5-S1 anterior lumbar interbody fusion, L5-S1 laminectomy and bilateral foraminotomy, and posterior spinal fusion with instrumentation. (Spine Lumbar) Diagnosis:       Spondylosis, unspecified      Spondylolisthesis, lumbosacral region      Mechanical back pain      (Spondylosis, unspecified [M47.9])      (Spondylolisthesis, lumbosacral region [M43.17])      (Mechanical back pain [M54.9])    Providers: León Mcgrath MD Responsible Provider: Perfecto Stratton MD    Anesthesia Type: General ASA Status: 3            Anesthesia Type: General    Aviva Phase I: Aviva Score: 8    Aviva Phase II:      Anesthesia Post Evaluation    Patient location during evaluation: bedside  Patient participation: complete - patient participated  Level of consciousness: awake and alert  Airway patency: patent  Nausea & Vomiting: no vomiting  Cardiovascular status: hemodynamically stable  Respiratory status: acceptable  Hydration status: euvolemic  Pain management: adequate        No notable events documented.

## 2024-11-12 LAB
CREAT SERPL-MCNC: 0.86 MG/DL (ref 0.6–1.1)
VANCOMYCIN SERPL-MCNC: 15.9 UG/ML

## 2024-11-12 PROCEDURE — 97535 SELF CARE MNGMENT TRAINING: CPT

## 2024-11-12 PROCEDURE — 82565 ASSAY OF CREATININE: CPT

## 2024-11-12 PROCEDURE — 36415 COLL VENOUS BLD VENIPUNCTURE: CPT

## 2024-11-12 PROCEDURE — 1100000000 HC RM PRIVATE

## 2024-11-12 PROCEDURE — 2580000003 HC RX 258: Performed by: STUDENT IN AN ORGANIZED HEALTH CARE EDUCATION/TRAINING PROGRAM

## 2024-11-12 PROCEDURE — 6370000000 HC RX 637 (ALT 250 FOR IP): Performed by: STUDENT IN AN ORGANIZED HEALTH CARE EDUCATION/TRAINING PROGRAM

## 2024-11-12 PROCEDURE — 97530 THERAPEUTIC ACTIVITIES: CPT

## 2024-11-12 PROCEDURE — 99024 POSTOP FOLLOW-UP VISIT: CPT | Performed by: NURSE PRACTITIONER

## 2024-11-12 PROCEDURE — 6360000002 HC RX W HCPCS: Performed by: STUDENT IN AN ORGANIZED HEALTH CARE EDUCATION/TRAINING PROGRAM

## 2024-11-12 PROCEDURE — 80202 ASSAY OF VANCOMYCIN: CPT

## 2024-11-12 RX ADMIN — PREGABALIN 150 MG: 75 CAPSULE ORAL at 09:11

## 2024-11-12 RX ADMIN — LOSARTAN POTASSIUM 50 MG: 50 TABLET, FILM COATED ORAL at 09:14

## 2024-11-12 RX ADMIN — ACETAMINOPHEN 650 MG: 325 TABLET ORAL at 10:47

## 2024-11-12 RX ADMIN — HYDROCODONE BITARTRATE AND ACETAMINOPHEN 1 TABLET: 10; 325 TABLET ORAL at 05:52

## 2024-11-12 RX ADMIN — METHOCARBAMOL TABLETS 750 MG: 750 TABLET, COATED ORAL at 09:11

## 2024-11-12 RX ADMIN — ENOXAPARIN SODIUM 40 MG: 100 INJECTION SUBCUTANEOUS at 09:12

## 2024-11-12 RX ADMIN — AMLODIPINE BESYLATE 10 MG: 5 TABLET ORAL at 09:11

## 2024-11-12 RX ADMIN — ACETAMINOPHEN 650 MG: 325 TABLET ORAL at 16:58

## 2024-11-12 RX ADMIN — SODIUM CHLORIDE, PRESERVATIVE FREE 5 ML: 5 INJECTION INTRAVENOUS at 21:11

## 2024-11-12 RX ADMIN — ACETAMINOPHEN 650 MG: 325 TABLET ORAL at 04:21

## 2024-11-12 RX ADMIN — HYDROCODONE BITARTRATE AND ACETAMINOPHEN 1 TABLET: 10; 325 TABLET ORAL at 16:58

## 2024-11-12 RX ADMIN — VANCOMYCIN HYDROCHLORIDE 750 MG: 750 INJECTION, POWDER, LYOPHILIZED, FOR SOLUTION INTRAVENOUS at 17:41

## 2024-11-12 RX ADMIN — METHOCARBAMOL TABLETS 750 MG: 750 TABLET, COATED ORAL at 11:57

## 2024-11-12 RX ADMIN — HYDROCHLOROTHIAZIDE 25 MG: 25 TABLET ORAL at 09:11

## 2024-11-12 RX ADMIN — VANCOMYCIN HYDROCHLORIDE 750 MG: 750 INJECTION, POWDER, LYOPHILIZED, FOR SOLUTION INTRAVENOUS at 10:46

## 2024-11-12 RX ADMIN — HYDROCODONE BITARTRATE AND ACETAMINOPHEN 1 TABLET: 10; 325 TABLET ORAL at 11:57

## 2024-11-12 RX ADMIN — MONTELUKAST 10 MG: 10 TABLET, FILM COATED ORAL at 21:05

## 2024-11-12 RX ADMIN — PREGABALIN 150 MG: 75 CAPSULE ORAL at 21:05

## 2024-11-12 RX ADMIN — METHOCARBAMOL TABLETS 750 MG: 750 TABLET, COATED ORAL at 16:58

## 2024-11-12 RX ADMIN — POLYETHYLENE GLYCOL 3350 17 G: 17 POWDER, FOR SOLUTION ORAL at 09:10

## 2024-11-12 RX ADMIN — METHOCARBAMOL TABLETS 750 MG: 750 TABLET, COATED ORAL at 21:05

## 2024-11-12 RX ADMIN — SODIUM CHLORIDE, PRESERVATIVE FREE 10 ML: 5 INJECTION INTRAVENOUS at 09:15

## 2024-11-12 ASSESSMENT — PAIN SCALES - GENERAL: PAINLEVEL_OUTOF10: 0

## 2024-11-12 NOTE — PROGRESS NOTES
ACUTE PHYSICAL THERAPY GOALS:   (Developed with and agreed upon by patient and/or caregiver.)  (1.) Jenna Lopez  will move from supine to sit and sit to supine  with STAND BY ASSIST within 7 treatment day(s).    (2.) Jenna Lopez will transfer from bed to chair and chair to bed with STAND BY ASSIST using the least restrictive device within 7 treatment day(s).    (3.) Jenna Lopez will ambulate with STAND BY ASSIST for 150 feet with the least restrictive device within 7 treatment day(s).   (4.) Jenna Lopez will perform standing static and dynamic balance activities x 10 minutes with STAND BY ASSIST to improve safety within 7 treatment day(s).  (5.) Jenna Lopez will perform therapeutic exercises x 10 min for HEP with SUPERVISION to improve strength, endurance, and functional mobility within 7 treatment day(s).    PHYSICAL THERAPY: Daily Note PM   (Link to Caseload Tracking: PT Visit Days : 4  Time In/Out PT Charge Capture  Rehab Caseload Tracker  Orders    Jenna Lopez is a 49 y.o. female   PRIMARY DIAGNOSIS: Spondylosis  Spondylosis, unspecified [M47.9]  Spondylolisthesis, lumbosacral region [M43.17]  Mechanical back pain [M54.9]  Spondylosis [M47.9]  Procedure(s) (LRB):  L5-S1 anterior lumbar interbody fusion, L5-S1 laminectomy and bilateral foraminotomy, and posterior spinal fusion with instrumentation. (N/A)  4 Days Post-Op  Inpatient: Payor: SAM / Plan: [facility] HMO / Product Type: *No Product type* /     ASSESSMENT:     REHAB RECOMMENDATIONS:   Recommendation to date pending progress:  Setting:  Home Health Therapy    Equipment:    Rolling Walker     ASSESSMENT:  Ms. Lopez was supine upon contact and agreeable to PT. Patient performed supine to sit via log rolling with SBA and cues for technique. Once seated EOB patient able to don lumbar corset with cues for technique. Patient transferred to standing with SBA,  additional time, and cues for technique/hand placement. Once standing patient ambulated slowly for 130'  with rolling walker, SBA and occasional cues for sequencing with rolling walker. Patient returned to room where she sat in recliner chair. Reviewed spinal precautions, LE exercises and guidelines of when to wear lumbar corset. Patient verbalized understanding. Steady progress towards PT goals. Will continue PT efforts.     SUBJECTIVE:   Ms. Lopez states, \"I guess I'm not going home today\"     Social/Functional Lives With: Family, Spouse  Type of Home: House  Home Layout: One level  Home Access: Level entry  Bathroom Shower/Tub: Walk-in shower  Bathroom Toilet: Standard  Home Equipment: None  Has the patient had two or more falls in the past year or any fall with injury in the past year?: No  ADL Assistance: Independent  Homemaking Assistance: Independent  Ambulation Assistance: Independent  Transfer Assistance: Independent  Occupation: On disability  OBJECTIVE:     PAIN: VITALS / O2: PRECAUTION / LINES / DRAINS:   Pre Treatment:   Pain Assessment: None - Denies Pain      Post Treatment: none stated Vitals        Oxygen    Hemovac    RESTRICTIONS/PRECAUTIONS:  Position Activity Restriction  Spinal Precautions: No Lifting, No Bending, No Twisting  Spinal Precautions: Log rolling  Sternal Precautions:  (15# lifting restriction per chart)     MOBILITY: I Mod I S SBA CGA Min Mod Max Total  NT x2 Comments:   Bed Mobility    Rolling [] [] [] [] [] [] [] [] [] [] []    Supine to Sit [] [] [] [x] [] [] [] [] [] [] []    Scooting [] [] [] [] [] [] [] [] [] [] []    Sit to Supine [] [] [] [] [] [] [] [] [] [] []    Transfers    Sit to Stand [] [] [] [x] [] [] [] [] [] [] []    Bed to Chair [] [] [] [] [] [] [] [] [] [] []    Stand to Sit [] [] [] [x] [] [] [] [] [] [] []     [] [] [] [] [] [] [] [] [] [] []    I=Independent, Mod I=Modified Independent, S=Supervision, SBA=Standby Assistance, CGA=Contact Guard Assistance,

## 2024-11-12 NOTE — PROGRESS NOTES
VANCO DAILY FOLLOW UP NOTE  [facility]   Pharmacy Pharmacokinetic Monitoring Service - Vancomycin    Consulting Provider: Alcides   Indication: surgical site  Target Concentration: Goal AUC/DEEPTI 400-600 mg*hr/L  Day of Therapy: 5  Additional Antimicrobials: none    Pertinent Laboratory Values:   Wt Readings from Last 1 Encounters:   11/09/24 86.6 kg (191 lb)     Temp Readings from Last 1 Encounters:   11/12/24 97.9 °F (36.6 °C) (Oral)     Recent Labs     11/12/24  1353   CREATININE 0.86     Estimated Creatinine Clearance: 77 mL/min (based on SCr of 0.86 mg/dL).    Lab Results   Component Value Date/Time    VANCORANDOM 15.9 11/12/2024 01:53 PM       MRSA Nasal Swab: N/A. Non-respiratory infection    Assessment:  Date/Time Dose Concentration AUC   11/10 0353 1500 mg q24h 8.5 304   11/12 1353 750 mg q8h 15.9 468   Note: Serum concentrations collected for AUC dosing may appear elevated if collected in close proximity to the dose administered, this is not necessarily an indication of toxicity    Plan:  Current dosing regimen is therapeutic  Will continue the current dose of 750 mg IV every 8 hours  Repeat vancomycin concentrations will be ordered as clinically appropriate   Pharmacy will continue to monitor patient and adjust therapy as indicated    Thank you for the consult,  Matt Singleton RPH

## 2024-11-12 NOTE — PROGRESS NOTES
ACUTE OCCUPATIONAL THERAPY GOALS:   (Developed with and agreed upon by patient and/or caregiver.)  1. Patient will complete lower body bathing and dressing with MOD I and adaptive equipment as needed.   2.Patient will complete upper body bathing and dressing with MOD I and adaptive equipment as needed.  3. Patient will complete toileting with MOD I.   4. Patient will tolerate 30 minutes of OT treatment with 1-2 rest breaks to increase activity tolerance for ADLs.   5. Patient will complete functional transfers with MOD I and adaptive equipment as needed.   6. Patient will complete functional activity with MOD I and adaptive equipment as needed.  7. Patient will complete simple grooming task standing at the sink with MOD I.     Timeframe: 7 visits      OCCUPATIONAL THERAPY: Daily Note PM   OT Visit Days: 3   Time In/Out  OT Charge Capture  Rehab Caseload Tracker  OT Orders    Jenna Lopez is a 49 y.o. female   PRIMARY DIAGNOSIS: Spondylosis  Spondylosis, unspecified [M47.9]  Spondylolisthesis, lumbosacral region [M43.17]  Mechanical back pain [M54.9]  Spondylosis [M47.9]  Procedure(s) (LRB):  L5-S1 anterior lumbar interbody fusion, L5-S1 laminectomy and bilateral foraminotomy, and posterior spinal fusion with instrumentation. (N/A)  4 Days Post-Op  Inpatient: Payor: SIENA / Plan: [facility] [employer] HMO / Product Type: *No Product type* /     ASSESSMENT:     REHAB RECOMMENDATIONS:   Recommendation to date pending progress:  Setting:  Home Health Therapy    Equipment:    To Be Determined     ASSESSMENT:  Ms. Lopez is doing well today. Pt presents sitting up in chair upon arrival. Pt reviewed UE exercises for correct technique. Pt also educated on energy conservation techniques this session. Pt verbalized understanding. Educated patient on use of AE to assist with safety in home at night and in bathroom. Making progress with goals. Will continue to benefit from skilled OT during stay.       SUBJECTIVE:

## 2024-11-12 NOTE — PROGRESS NOTES
ACUTE PHYSICAL THERAPY GOALS:   (Developed with and agreed upon by patient and/or caregiver.)  (1.) Jenna Lopez  will move from supine to sit and sit to supine  with STAND BY ASSIST within 7 treatment day(s).    (2.) Jenna Lopez will transfer from bed to chair and chair to bed with STAND BY ASSIST using the least restrictive device within 7 treatment day(s).    (3.) Jenna Lopez will ambulate with STAND BY ASSIST for 150 feet with the least restrictive device within 7 treatment day(s).   (4.) Jenna Lopez will perform standing static and dynamic balance activities x 10 minutes with STAND BY ASSIST to improve safety within 7 treatment day(s).  (5.) Jenna Lopez will perform therapeutic exercises x 10 min for HEP with SUPERVISION to improve strength, endurance, and functional mobility within 7 treatment day(s).    PHYSICAL THERAPY: Daily Note PM   (Link to Caseload Tracking: PT Visit Days : 4  Time In/Out PT Charge Capture  Rehab Caseload Tracker  Orders    Jenna Lopez is a 49 y.o. female   PRIMARY DIAGNOSIS: Spondylosis  Spondylosis, unspecified [M47.9]  Spondylolisthesis, lumbosacral region [M43.17]  Mechanical back pain [M54.9]  Spondylosis [M47.9]  Procedure(s) (LRB):  L5-S1 anterior lumbar interbody fusion, L5-S1 laminectomy and bilateral foraminotomy, and posterior spinal fusion with instrumentation. (N/A)  4 Days Post-Op  Inpatient: Payor: SAM / [facility] / Product Type: *No Product type* /     ASSESSMENT:     REHAB RECOMMENDATIONS:   Recommendation to date pending progress:  Setting:  Home Health Therapy    Equipment:    Rolling Walker     ASSESSMENT:  Ms. Lopez was sitting up in recliner chair upon contact and agreeable to PT. Lumbar corset noted in windowsill. Educated patient on surgeon recommendation to wear brace anytime OOB. Patient verbalized understanding but stated it was too painful against

## 2024-11-13 VITALS
DIASTOLIC BLOOD PRESSURE: 80 MMHG | RESPIRATION RATE: 16 BRPM | TEMPERATURE: 97.9 F | HEIGHT: 60 IN | HEART RATE: 99 BPM | OXYGEN SATURATION: 99 % | BODY MASS INDEX: 37.5 KG/M2 | SYSTOLIC BLOOD PRESSURE: 117 MMHG | WEIGHT: 191 LBS

## 2024-11-13 PROCEDURE — 99024 POSTOP FOLLOW-UP VISIT: CPT | Performed by: NURSE PRACTITIONER

## 2024-11-13 PROCEDURE — 2580000003 HC RX 258: Performed by: STUDENT IN AN ORGANIZED HEALTH CARE EDUCATION/TRAINING PROGRAM

## 2024-11-13 PROCEDURE — 6360000002 HC RX W HCPCS: Performed by: STUDENT IN AN ORGANIZED HEALTH CARE EDUCATION/TRAINING PROGRAM

## 2024-11-13 PROCEDURE — 6370000000 HC RX 637 (ALT 250 FOR IP): Performed by: STUDENT IN AN ORGANIZED HEALTH CARE EDUCATION/TRAINING PROGRAM

## 2024-11-13 PROCEDURE — 97530 THERAPEUTIC ACTIVITIES: CPT

## 2024-11-13 RX ORDER — OXYCODONE HYDROCHLORIDE 5 MG/1
5 TABLET ORAL EVERY 6 HOURS PRN
Qty: 20 TABLET | Refills: 0 | Status: SHIPPED | OUTPATIENT
Start: 2024-11-13 | End: 2024-11-18

## 2024-11-13 RX ORDER — METHOCARBAMOL 750 MG/1
750 TABLET, FILM COATED ORAL 4 TIMES DAILY
Qty: 40 TABLET | Refills: 0 | Status: SHIPPED | OUTPATIENT
Start: 2024-11-13 | End: 2024-11-23

## 2024-11-13 RX ADMIN — METHOCARBAMOL TABLETS 750 MG: 750 TABLET, COATED ORAL at 08:56

## 2024-11-13 RX ADMIN — POLYETHYLENE GLYCOL 3350 17 G: 17 POWDER, FOR SOLUTION ORAL at 08:56

## 2024-11-13 RX ADMIN — LOSARTAN POTASSIUM 50 MG: 50 TABLET, FILM COATED ORAL at 08:56

## 2024-11-13 RX ADMIN — HYDROCODONE BITARTRATE AND ACETAMINOPHEN 1 TABLET: 10; 325 TABLET ORAL at 04:11

## 2024-11-13 RX ADMIN — ENOXAPARIN SODIUM 40 MG: 100 INJECTION SUBCUTANEOUS at 08:56

## 2024-11-13 RX ADMIN — PREGABALIN 150 MG: 75 CAPSULE ORAL at 08:56

## 2024-11-13 RX ADMIN — VANCOMYCIN HYDROCHLORIDE 750 MG: 750 INJECTION, POWDER, LYOPHILIZED, FOR SOLUTION INTRAVENOUS at 01:45

## 2024-11-13 RX ADMIN — TRAMADOL HYDROCHLORIDE 50 MG: 50 TABLET ORAL at 09:03

## 2024-11-13 RX ADMIN — HYDROCHLOROTHIAZIDE 25 MG: 25 TABLET ORAL at 08:56

## 2024-11-13 RX ADMIN — HYDROCODONE BITARTRATE AND ACETAMINOPHEN 1 TABLET: 10; 325 TABLET ORAL at 11:06

## 2024-11-13 RX ADMIN — SODIUM CHLORIDE, PRESERVATIVE FREE 10 ML: 5 INJECTION INTRAVENOUS at 09:28

## 2024-11-13 RX ADMIN — ACETAMINOPHEN 650 MG: 325 TABLET ORAL at 04:11

## 2024-11-13 RX ADMIN — ACETAMINOPHEN 650 MG: 325 TABLET ORAL at 11:06

## 2024-11-13 RX ADMIN — AMLODIPINE BESYLATE 10 MG: 5 TABLET ORAL at 08:56

## 2024-11-13 ASSESSMENT — PAIN SCALES - GENERAL
PAINLEVEL_OUTOF10: 2
PAINLEVEL_OUTOF10: 6
PAINLEVEL_OUTOF10: 7

## 2024-11-13 ASSESSMENT — PAIN DESCRIPTION - LOCATION
LOCATION: BACK
LOCATION: BACK

## 2024-11-13 ASSESSMENT — PAIN DESCRIPTION - DESCRIPTORS: DESCRIPTORS: SORE

## 2024-11-13 NOTE — CARE COORDINATION
Pt is medically cleared for dc to home today with  PT/OT services through [facility].  DC summary sent to their office and they were notified of pt's dc today. Fixed wheel rolling walker supplied by [employer] delivered to room.  Consent signed and faxed to their office.  No other LUIS ENRIQUE needs or concerns identified or reported.  CM remains available to assist as needed.       11/11/24 1102   Service Assessment   Patient Orientation Alert and Oriented   Cognition Alert   History Provided By Patient;Medical Record   Primary Caregiver Self   Accompanied By/Relationship n/a   Support Systems Spouse/Significant Other;Family Members   Patient's Healthcare Decision Maker is: Legal Next of Kin   PCP Verified by CM Yes   Last Visit to PCP Within last 3 months  (10/11/2024)   Prior Functional Level Independent in ADLs/IADLs   Current Functional Level Assistance with the following:;Mobility   Can patient return to prior living arrangement Yes   Ability to make needs known: Good   Family able to assist with home care needs: Yes   Would you like for me to discuss the discharge plan with any other family members/significant others, and if so, who? No   Financial Resources Medicare;Other (Comment)  ([employer] commercial policy)   Community Resources None   Social/Functional History   Lives With Family;Spouse   Type of Home House   Home Layout One level   Home Access Level entry   Bathroom Shower/Tub Walk-in shower   Bathroom Toilet Standard   Home Equipment None   ADL Assistance Independent   Homemaking Assistance Independent   Homemaking Responsibilities Yes   Ambulation Assistance Independent   Transfer Assistance Independent   Occupation On disability   Discharge Planning   Type of Residence House   Living Arrangements Spouse/Significant Other;Family Members   Current Services Prior To Admission None   Potential Assistance Needed Home Care;Durable Medical Equipment   Potential DME Needed Walker   DME Ordered? Walker  (fixed  wheel rolling walker from [facility])   Potential Assistance Purchasing Medications No   Type of Home Care Services PT;OT   Patient expects to be discharged to: House   Services At/After Discharge   Transition of Care Consult (CM Consult) Home Health;DME/Supply Assistance  (no CM consult received)   Internal Home Health No  ([facility])   Reason Outside Agency Chosen Managed care specific requirement   Services At/After Discharge Home Health;DME;PT;OT   [place] Resource Information Provided? No   Mode of Transport at Discharge Other (see comment)  (family)   Confirm Follow Up Transport Family   Condition of Participation: Discharge Planning   The Plan for Transition of Care is related to the following treatment goals: Home health therapy services to support pt's care and recovery in the home s/p spine surgery.   The Patient and/or Patient Representative was provided with a Choice of Provider? Patient   The Patient and/Or Patient Representative agree with the Discharge Plan? Yes   Freedom of Choice list was provided with basic dialogue that supports the patient's individualized plan of care/goals, treatment preferences, and shares the quality data associated with the providers?  Yes

## 2024-11-13 NOTE — DISCHARGE SUMMARY
NEUROSURGERY DISCHARGE SUMMARY     Patient Name: Jenna Lopez  Patient MRN: [id number]  Date of Admission: 2024  Date of Discharge: 2024   Length of Hospital Stay: 5    Pre-Procedure Diagnosis:    * Spondylosis, unspecified [M47.9]     * Spondylolisthesis, lumbosacral region [M43.17]     * Mechanical back pain [M54.9]  Post-Procedure Diagnosis: SAME AS ABOVE     Admitting Attending: León Mcgrath MD   Discharge Attending: NGOZI Kelly - CNP    Past Medical History:   Diagnosis Date    Anxiety     AR (allergic rhinitis)     Arthritis     back    Asthma     rescue inhaler    Chronic back pain          Chronic pain syndrome     Depression     GERD (gastroesophageal reflux disease)     H/O gastric sleeve     History of methicillin resistant Staph aureus     back area after spinal cord stimulator removal    Hypertension     managed with meds    Low back pain     Lumbosacral disc disease     Morbid obesity      BMI:37.5    Neuropathy of right foot          Pure hypercholesterolemia     Sciatica     Seasonal allergies     Sinus problem     Spondylolisthesis, lumbosacral region     surgery scheduled on 24     Past Surgical History:   Procedure Laterality Date    BACK SURGERY  2015    spinal cord stimulator - removed     SECTION      x2    GYN       uterine ablation    LUMBAR FUSION N/A 2024    L5-S1 anterior lumbar interbody fusion, L5-S1 laminectomy and bilateral foraminotomy, and posterior spinal fusion with instrumentation. performed by Lenó Mcgrath MD at [facility] MAIN OR    LUMBAR LAMINECTOMY       Dr. Mckenzie (x 2)    LUMBAR LAMINECTOMY       Dr. Titus (x1),    PARTIAL HYSTERECTOMY (CERVIX NOT REMOVED)      uterine ablation    SLEEVE GASTRECTOMY N/A 2023    ERAS/ GASTRECTOMY SLEEVE LAPAROSCOPIC [place] Neftali Steel [place] [facility] [place]    THUMB ARTHROSCOPY Right     torn ligament    TUBAL LIGATION      w last CS     Allergies   Allergen

## 2024-11-13 NOTE — PLAN OF CARE
Problem: Pain  Goal: Verbalizes/displays adequate comfort level or baseline comfort level  11/13/2024 1114 by Autumn Benson RN  Outcome: Progressing  11/12/2024 2128 by Leatha Monk RN  Outcome: Progressing     Problem: Safety - Adult  Goal: Free from fall injury  11/13/2024 1114 by Autumn Benson RN  Outcome: Progressing  11/12/2024 2128 by Leatha Monk RN  Outcome: Progressing  Flowsheets (Taken 11/12/2024 1925)  Free From Fall Injury: Instruct family/caregiver on patient safety     Problem: Discharge Planning  Goal: Discharge to home or other facility with appropriate resources  11/13/2024 1114 by Autumn Benson RN  Outcome: Progressing  Flowsheets (Taken 11/13/2024 0815)  Discharge to home or other facility with appropriate resources: Identify barriers to discharge with patient and caregiver  11/12/2024 2128 by Leatha Monk RN  Outcome: Progressing  Flowsheets (Taken 11/12/2024 1925)  Discharge to home or other facility with appropriate resources: Identify barriers to discharge with patient and caregiver     Problem: ABCDS Injury Assessment  Goal: Absence of physical injury  11/13/2024 1114 by Autumn Benson RN  Outcome: Progressing  11/12/2024 2128 by Leatha Monk RN  Outcome: Progressing     Problem: Neurosensory - Adult  Goal: Achieves stable or improved neurological status  11/13/2024 1114 by Autumn Benson RN  Outcome: Progressing  Flowsheets (Taken 11/13/2024 0815)  Achieves stable or improved neurological status: Assess for and report changes in neurological status  11/12/2024 2128 by Leatha Monk RN  Outcome: Progressing  Flowsheets (Taken 11/12/2024 1925)  Achieves stable or improved neurological status: Assess for and report changes in neurological status     Problem: Respiratory - Adult  Goal: Achieves optimal ventilation and oxygenation  11/13/2024 1114 by Autumn Benson RN  Outcome: Progressing  Flowsheets (Taken 11/13/2024 0815)  Achieves  stability and optimal renal function maintained: Monitor labs and assess for signs and symptoms of volume excess or deficit  11/12/2024 2128 by Leatha Monk RN  Outcome: Progressing  Flowsheets (Taken 11/12/2024 1925)  Hemodynamic stability and optimal renal function maintained: Monitor labs and assess for signs and symptoms of volume excess or deficit  Goal: Glucose maintained within prescribed range  Recent Flowsheet Documentation  Taken 11/13/2024 0815 by Autumn Benson RN  Glucose maintained within prescribed range: Monitor blood glucose as ordered  11/12/2024 2128 by Leatha Monk RN  Outcome: Progressing  Flowsheets (Taken 11/12/2024 1925)  Glucose maintained within prescribed range: Monitor blood glucose as ordered     Problem: Hematologic - Adult  Goal: Maintains hematologic stability  Recent Flowsheet Documentation  Taken 11/13/2024 0815 by Autumn Benson RN  Maintains hematologic stability: Assess for signs and symptoms of bleeding or hemorrhage  11/12/2024 2128 by Leatha Monk RN  Outcome: Progressing  Flowsheets (Taken 11/12/2024 1925)  Maintains hematologic stability: Assess for signs and symptoms of bleeding or hemorrhage

## 2024-11-13 NOTE — PROGRESS NOTES
ACUTE PHYSICAL THERAPY GOALS:   (Developed with and agreed upon by patient and/or caregiver.)  (1.) Jenna Lopez  will move from supine to sit and sit to supine  with STAND BY ASSIST within 7 treatment day(s).    (2.) Jenna Lopez will transfer from bed to chair and chair to bed with STAND BY ASSIST using the least restrictive device within 7 treatment day(s).    (3.) Jenna Lopez will ambulate with STAND BY ASSIST for 150 feet with the least restrictive device within 7 treatment day(s).   (4.) Jenna Lopez will perform standing static and dynamic balance activities x 10 minutes with STAND BY ASSIST to improve safety within 7 treatment day(s).  (5.) Jenna Lopez will perform therapeutic exercises x 10 min for HEP with SUPERVISION to improve strength, endurance, and functional mobility within 7 treatment day(s).    PHYSICAL THERAPY: Daily Note PM   (Link to Caseload Tracking: PT Visit Days : 5  Time In/Out PT Charge Capture  Rehab Caseload Tracker  Orders    Jenna Lopez is a 49 y.o. female   PRIMARY DIAGNOSIS: Spondylosis  Spondylosis, unspecified [M47.9]  Spondylolisthesis, lumbosacral region [M43.17]  Mechanical back pain [M54.9]  Spondylosis [M47.9]  Procedure(s) (LRB):  L5-S1 anterior lumbar interbody fusion, L5-S1 laminectomy and bilateral foraminotomy, and posterior spinal fusion with instrumentation. (N/A)  5 Days Post-Op  Inpatient: [facility] / [facility] / Product Type: *No Product type* /     ASSESSMENT:     REHAB RECOMMENDATIONS:   Recommendation to date pending progress:  Setting:  Home Health Therapy    Equipment:    Rolling Walker     ASSESSMENT:  Ms. Lopez was supine upon contact and agreeable to PT. Patient performed supine to sit via log rolling with SBA and cues for technique. Once seated EOB patient able to don lumbar corset with cues for technique. Patient transferred to standing with SBA,  []    Bed to Chair [] [] [] [] [] [] [] [] [] [] []    Stand to Sit [] [] [] [x] [] [] [] [] [] [] []     [] [] [] [] [] [] [] [] [] [] []    I=Independent, Mod I=Modified Independent, S=Supervision, SBA=Standby Assistance, CGA=Contact Guard Assistance,   Min=Minimal Assistance, Mod=Moderate Assistance, Max=Maximal Assistance, Total=Total Assistance, NT=Not Tested    BALANCE: Good Fair+ Fair Fair- Poor NT Comments   Sitting Static [x] [] [] [] [] []    Sitting Dynamic [x] [] [] [] [] []              Standing Static [x] [] [] [] [] [] With RW   Standing Dynamic [] [x] [] [] [] [] With RW     GAIT: I Mod I S SBA CGA Min Mod Max Total  NT x2 Comments:   Level of Assistance [] [] [] [x] [] [] [] [] [] [] []    Distance 250  feet    DME Rolling Walker    Gait Quality Decreased yo  and Decreased step length    Weightbearing Status      Stairs      I=Independent, Mod I=Modified Independent, S=Supervision, SBA=Standby Assistance, CGA=Contact Guard Assistance,   Min=Minimal Assistance, Mod=Moderate Assistance, Max=Maximal Assistance, Total=Total Assistance, NT=Not Tested    PLAN:   FREQUENCY AND DURATION: BID for duration of hospital stay or until stated goals are met, whichever comes first.    TREATMENT:   TREATMENT:   Therapeutic Activity (26 Minutes): Therapeutic activity included Rolling, Supine to Sit, Scooting, Transfer Training, Ambulation on level ground, Sitting balance , Standing balance, and review of spinal precautions, seated/standing LE exercises, and all discharge instructions regarding PT/mobility to improve functional Activity tolerance, Balance, Mobility, and Strength.    TREATMENT GRID:  N/A    AFTER TREATMENT PRECAUTIONS: Bed/Chair Locked, Call light within reach, Chair, Needs within reach, and RN notified    INTERDISCIPLINARY COLLABORATION:  RN/ PCT and PT/ PTA    EDUCATION:      TIME IN/OUT:  Time In: 0824  Time Out: 0850  Minutes: 26    Nereida Taveras PTA

## 2024-11-15 ENCOUNTER — TELEPHONE (OUTPATIENT)
Dept: NEUROSURGERY | Age: 49
End: 2024-11-15

## 2024-11-15 NOTE — TELEPHONE ENCOUNTER
Elissa from home health called, incision looks good. Pt wanted to know if she can take her hydrocodone instead of oxycodone. Please advise, thanks

## 2024-11-18 NOTE — OP NOTE
SHANK  [facility] [id number] N/A 1 Implanted   MYRANDA SPNL L45MM DIA5.5MM POST THORACOLUMBOSACRAL TI LORD - [id number]  MYRANDA SPNL L45MM DIA5.5MM POST THORACOLUMBOSACRAL TI LORD  [facility] [id number] N/A 1 Implanted   MYRANDA SPNL L50MM DIA5.5MM POST THORACOLUMBOSACRAL TI LORD - [id number]  MYRANDA SPNL L50MM DIA5.5MM POST THORACOLUMBOSACRAL TI LORD  [employer] [facility] [id number] N/A 1 Implanted   K WIRE FIX NIT BVL BLNT TIP PRECEPT - [id number]  K WIRE FIX NIT BVL BLNT TIP PRECEPT  [employer] [facility] [id number] N/A 1 Implanted

## 2024-11-19 ENCOUNTER — NURSE ONLY (OUTPATIENT)
Dept: NEUROSURGERY | Age: 49
End: 2024-11-19

## 2024-11-19 VITALS
WEIGHT: 198 LBS | SYSTOLIC BLOOD PRESSURE: 129 MMHG | HEART RATE: 96 BPM | BODY MASS INDEX: 38.87 KG/M2 | HEIGHT: 60 IN | TEMPERATURE: 97.2 F | OXYGEN SATURATION: 99 % | DIASTOLIC BLOOD PRESSURE: 78 MMHG

## 2024-11-19 DIAGNOSIS — M47.9 SPONDYLOSIS: ICD-10-CM

## 2024-11-19 DIAGNOSIS — M43.17 SPONDYLOLISTHESIS, LUMBOSACRAL REGION: Primary | ICD-10-CM

## 2024-11-19 NOTE — PATIENT INSTRUCTIONS
Postoperative Home Instructions Lumbar Surgery    Showering: Do not soak in bathtub, hot tub or swimming pool for 3 weeks. Use only soap and water on the incision and pat it dry. Do not scrub the incision.    Wound Care: A small to moderate amount of reddish drainage on the dressing is normal for the first several days after surgery. Large amounts of clear, watery drainage is not normal and you should call doctors office immediately.  Please call if any drainage noted to wound after suture visit.    Signs of Infection: Please notify your physician for any of the following; a temperature greater than 100.5, extreme tenderness at the wound, excessive redness and/or swelling, or large amounts of drainage.    Driving: You may begin driving after your first visit to the physicians office for a wound check. If you had a lumbar fusion with instrumentation, no driving until directed.    Medication: You can take anti-inflammatory medications such as Motrin or Celebrex for 30 days after surgery only if prescribed by your physician. The pain medication prescribed may be taken as needed. You should take a stool softener twice a day, drink plenty of water and eat high fiber foods to avoid constipation. This is a common problem patients experience while taking pain medication.    Deep Breathing Exercises: Continue to use your incentive spirometer.    Smoking: Smoking will interfere with your healing. If you smoke you may end up having another surgery or more problems.    Lumbar Corsette: If prescribed by your physician, wear your corsette when standing, sitting or walking for a long period of time. You do not have to wear your corsette to sleep at night.    Activity: No heavy lifting. Your weight limit is 2-5 pounds until directed by your physician. Avoid bending, twisting at the waist or stretching above your head where incision is being pulled. You should bend with your knees and keep your back straight if needing to pick

## 2024-11-20 NOTE — PROGRESS NOTES
Subjective: Patient states he has remaining bilat calf burning, pressing sensation of his hips, numbness and tingling lateral calf areas, and both feet cramping R>L    Consent: verbal consent obtained and given by the patient. Risks of removal include bleeding,infection,reopening and excessive scarring.  Objective:  /78 (Site: Left Upper Arm)   Pulse 96   Temp 97.2 °F (36.2 °C) (Temporal)   Ht 1.524 m (5')   Wt 89.8 kg (198 lb)   SpO2 99%   BMI 38.67 kg/m²   Pain Scale: 6/10    General: patient is cooperative; mood and affect are appropriate; denies any fevers,chills or headache; denies any fatigue or weakness  HEENT:trachea is midline; no voice, visual or swallowing difficulty; good range of motion with neck; mucous membranes are normal  Neuro:alert and oriented; denies any dizziness; gait and coordination observed are normal; ambulates with assistance of a walker  Other findings: lumbar brace on at all times when above 30 degrees  Wound: lumbar spine incision with no signs of infection or inflammation; Exofen is off for a slight proximal un approximated area- incision cleansed with betadine , benzoin sprayed and placed steri strips and intact- not to get incision wet for 5-7 days to allow for healing  Number of sutures/staples removed: none ;glue is adherant    Assessment:  Post op check: Patient is here for initial post-op check. Dr. Fish   is the supervising physician in clinic today and approves of today's treatment plan      Plan:  Post-op instructions: strict 2-5 pound weight limit; frequent positional changes; no bending ,lifting or twisting;instructed on proper body mechanics; instructed on short distance driving with considerations of no driving in pain,while on opioids and muscle relaxer's and able to safely check vehicle blind spot  Incisional care: not to saturate and pat dry;do not itch scratch or submerge  Management and expected pain:advised patient of expected nerve swelling and length

## 2024-12-02 ENCOUNTER — PATIENT MESSAGE (OUTPATIENT)
Dept: NEUROSURGERY | Age: 49
End: 2024-12-02

## 2024-12-03 ENCOUNTER — TRANSCRIBE ORDERS (OUTPATIENT)
Dept: SCHEDULING | Age: 49
End: 2024-12-03

## 2024-12-03 ENCOUNTER — NURSE ONLY (OUTPATIENT)
Dept: NEUROSURGERY | Age: 49
End: 2024-12-03

## 2024-12-03 ENCOUNTER — HOSPITAL ENCOUNTER (OUTPATIENT)
Dept: ULTRASOUND IMAGING | Age: 49
Discharge: HOME | End: 2024-12-06
Attending: STUDENT IN AN ORGANIZED HEALTH CARE EDUCATION/TRAINING PROGRAM
Payer: COMMERCIAL

## 2024-12-03 ENCOUNTER — HOSPITAL ENCOUNTER (OUTPATIENT)
Dept: GENERAL RADIOLOGY | Age: 49
Discharge: HOME | End: 2024-12-06
Attending: STUDENT IN AN ORGANIZED HEALTH CARE EDUCATION/TRAINING PROGRAM
Payer: COMMERCIAL

## 2024-12-03 VITALS
TEMPERATURE: 97.7 F | BODY MASS INDEX: 39.68 KG/M2 | WEIGHT: 202.13 LBS | HEIGHT: 60 IN | DIASTOLIC BLOOD PRESSURE: 86 MMHG | SYSTOLIC BLOOD PRESSURE: 145 MMHG

## 2024-12-03 DIAGNOSIS — M79.605 LEFT LEG PAIN: ICD-10-CM

## 2024-12-03 DIAGNOSIS — M79.604 RIGHT LEG PAIN: ICD-10-CM

## 2024-12-03 DIAGNOSIS — M43.17 SPONDYLOLISTHESIS, LUMBOSACRAL REGION: Primary | ICD-10-CM

## 2024-12-03 DIAGNOSIS — M79.605 LEFT LEG PAIN: Primary | ICD-10-CM

## 2024-12-03 DIAGNOSIS — M43.17 SPONDYLOLISTHESIS, LUMBOSACRAL REGION: ICD-10-CM

## 2024-12-03 DIAGNOSIS — R19.04 LEFT LOWER QUADRANT ABDOMINAL SWELLING: ICD-10-CM

## 2024-12-03 DIAGNOSIS — M47.9 SPONDYLOSIS: ICD-10-CM

## 2024-12-03 DIAGNOSIS — M43.17 SPONDYLOLISTHESIS OF LUMBOSACRAL REGION: ICD-10-CM

## 2024-12-03 DIAGNOSIS — M79.605 LOWER EXTREMITY PAIN, BILATERAL: ICD-10-CM

## 2024-12-03 DIAGNOSIS — M79.604 LOWER EXTREMITY PAIN, BILATERAL: ICD-10-CM

## 2024-12-03 PROCEDURE — 93970 EXTREMITY STUDY: CPT

## 2024-12-04 NOTE — PROGRESS NOTES
Subjective: Patient returns for a wound check with concerns over drainage from near incisional site, bilat hip soreness with continue BLE pain , drainage tube area of discomfort and left side umbilical swelling with discomfort    Consent: verbal consent obtained and given by the patient. Risks of removal include bleeding,infection,reopening and excessive scarring.  Objective:  BP (!) 145/86 (Site: Left Upper Arm)   Temp 97.7 °F (36.5 °C) (Temporal)   Ht 1.524 m (5')   Wt 91.7 kg (202 lb 2 oz)   BMI 39.47 kg/m²   Pain Scale: 5/10    General: patient is cooperative; mood and affect are appropriate; denies any fevers,chills or headache; denies any fatigue or weakness  HEENT:trachea is midline; no voice, visual or swallowing difficulty; good range of motion with neck; mucous membranes are normal  Neuro:alert and oriented; denies any dizziness; gait and coordination observed are normal; ambulates with assistance of a walker  Other findings: left side of umbilical area palpated for an area or \"hardness\" and discomfort; she states she does have some leg swelling noted  Wound: anterior abdominal incision Exofen mesh removed for a healing, well approximated incisional site; lumbar incision healing well with no signs of infection- proximal steri strips were removed; below lumbar incision left side of intergluteal cleft- approx 1 cm ,open bed of tissue noted- pink, clean ,open tissue with no tunneling noted ( area NOT on incisional line); right lumbar drain site glue adherent and clipped a some vicryl suture    Assessment:  Post op check: Patient is here for initial post-op check. Dr. Mcgrath  is the supervising physician in clinic today and approves of today's treatment plan; Dr Mcgrath examined wound. Per Dr Mcgrath- start betadine daily swabs to open tissue area- keep area clean; CT of lumbar spine and CT of abdomen- after CT abdomen to f/u with vascular; STAT BLE duplex studies      Plan:  Post-op instructions: strict

## 2024-12-11 ENCOUNTER — HOSPITAL ENCOUNTER (OUTPATIENT)
Dept: CT IMAGING | Age: 49
Discharge: HOME | End: 2024-12-14
Attending: STUDENT IN AN ORGANIZED HEALTH CARE EDUCATION/TRAINING PROGRAM
Payer: MEDICARE

## 2024-12-11 DIAGNOSIS — M43.17 SPONDYLOLISTHESIS, LUMBOSACRAL REGION: ICD-10-CM

## 2024-12-11 DIAGNOSIS — R19.04 LEFT LOWER QUADRANT ABDOMINAL SWELLING: ICD-10-CM

## 2024-12-11 DIAGNOSIS — M79.605 LOWER EXTREMITY PAIN, BILATERAL: ICD-10-CM

## 2024-12-11 DIAGNOSIS — M47.9 SPONDYLOSIS: ICD-10-CM

## 2024-12-11 DIAGNOSIS — M79.604 LOWER EXTREMITY PAIN, BILATERAL: ICD-10-CM

## 2024-12-11 DIAGNOSIS — M43.17 SPONDYLOLISTHESIS OF LUMBOSACRAL REGION: ICD-10-CM

## 2024-12-11 PROCEDURE — 74150 CT ABDOMEN W/O CONTRAST: CPT

## 2024-12-11 PROCEDURE — 72131 CT LUMBAR SPINE W/O DYE: CPT

## 2024-12-12 ENCOUNTER — TELEPHONE (OUTPATIENT)
Dept: SURGERY | Age: 49
End: 2024-12-12

## 2024-12-12 DIAGNOSIS — Z71.3 NUTRITIONAL COUNSELING: Primary | ICD-10-CM

## 2024-12-12 NOTE — TELEPHONE ENCOUNTER
Pt has 1 year follow up appointment with Dr. Steel on 12/24/24, called pt to see if she needed anything nutritional as I will not be in the office. Pt reports no issues. Pt reports regularly taking MVI and meeting fluid goals.    Diet recall:  B: Egg or protein shake  L: Salad or chx salad  D: Chx pot pie    Pt reports recently having back surgery last month and walking as she is able to. Informed pt that she still has her appointment with Dr. Steel on 12/24 and encouraged pt to call if any nutrition concerns arise - pt verbalized understanding.

## 2024-12-16 ENCOUNTER — OFFICE VISIT (OUTPATIENT)
Dept: VASCULAR SURGERY | Age: 49
End: 2024-12-16
Payer: MEDICARE

## 2024-12-16 VITALS
DIASTOLIC BLOOD PRESSURE: 86 MMHG | OXYGEN SATURATION: 96 % | BODY MASS INDEX: 38.28 KG/M2 | WEIGHT: 195 LBS | HEART RATE: 118 BPM | SYSTOLIC BLOOD PRESSURE: 129 MMHG | HEIGHT: 60 IN

## 2024-12-16 DIAGNOSIS — L76.82 INCISIONAL PAIN: Primary | ICD-10-CM

## 2024-12-16 PROCEDURE — G8484 FLU IMMUNIZE NO ADMIN: HCPCS | Performed by: STUDENT IN AN ORGANIZED HEALTH CARE EDUCATION/TRAINING PROGRAM

## 2024-12-16 PROCEDURE — 3074F SYST BP LT 130 MM HG: CPT | Performed by: STUDENT IN AN ORGANIZED HEALTH CARE EDUCATION/TRAINING PROGRAM

## 2024-12-16 PROCEDURE — G8417 CALC BMI ABV UP PARAM F/U: HCPCS | Performed by: STUDENT IN AN ORGANIZED HEALTH CARE EDUCATION/TRAINING PROGRAM

## 2024-12-16 PROCEDURE — G8427 DOCREV CUR MEDS BY ELIG CLIN: HCPCS | Performed by: STUDENT IN AN ORGANIZED HEALTH CARE EDUCATION/TRAINING PROGRAM

## 2024-12-16 PROCEDURE — 1036F TOBACCO NON-USER: CPT | Performed by: STUDENT IN AN ORGANIZED HEALTH CARE EDUCATION/TRAINING PROGRAM

## 2024-12-16 PROCEDURE — 99213 OFFICE O/P EST LOW 20 MIN: CPT | Performed by: STUDENT IN AN ORGANIZED HEALTH CARE EDUCATION/TRAINING PROGRAM

## 2024-12-16 PROCEDURE — 3079F DIAST BP 80-89 MM HG: CPT | Performed by: STUDENT IN AN ORGANIZED HEALTH CARE EDUCATION/TRAINING PROGRAM

## 2024-12-16 RX ORDER — DOXYCYCLINE 100 MG/1
100 CAPSULE ORAL 2 TIMES DAILY
Qty: 20 CAPSULE | Refills: 0 | Status: SHIPPED | OUTPATIENT
Start: 2024-12-16 | End: 2024-12-26

## 2024-12-17 ENCOUNTER — OFFICE VISIT (OUTPATIENT)
Dept: NEUROSURGERY | Age: 49
End: 2024-12-17
Payer: COMMERCIAL

## 2024-12-17 VITALS
BODY MASS INDEX: 38.09 KG/M2 | SYSTOLIC BLOOD PRESSURE: 112 MMHG | HEIGHT: 60 IN | OXYGEN SATURATION: 96 % | HEART RATE: 115 BPM | DIASTOLIC BLOOD PRESSURE: 75 MMHG | TEMPERATURE: 97.9 F | WEIGHT: 194 LBS

## 2024-12-17 DIAGNOSIS — M47.9 SPONDYLOSIS: Primary | ICD-10-CM

## 2024-12-17 DIAGNOSIS — M43.17 SPONDYLOLISTHESIS, LUMBOSACRAL REGION: ICD-10-CM

## 2024-12-17 PROCEDURE — 3078F DIAST BP <80 MM HG: CPT | Performed by: STUDENT IN AN ORGANIZED HEALTH CARE EDUCATION/TRAINING PROGRAM

## 2024-12-17 PROCEDURE — 99213 OFFICE O/P EST LOW 20 MIN: CPT | Performed by: STUDENT IN AN ORGANIZED HEALTH CARE EDUCATION/TRAINING PROGRAM

## 2024-12-17 PROCEDURE — G8427 DOCREV CUR MEDS BY ELIG CLIN: HCPCS | Performed by: STUDENT IN AN ORGANIZED HEALTH CARE EDUCATION/TRAINING PROGRAM

## 2024-12-17 PROCEDURE — 3074F SYST BP LT 130 MM HG: CPT | Performed by: STUDENT IN AN ORGANIZED HEALTH CARE EDUCATION/TRAINING PROGRAM

## 2024-12-17 PROCEDURE — G8417 CALC BMI ABV UP PARAM F/U: HCPCS | Performed by: STUDENT IN AN ORGANIZED HEALTH CARE EDUCATION/TRAINING PROGRAM

## 2024-12-17 PROCEDURE — 1036F TOBACCO NON-USER: CPT | Performed by: STUDENT IN AN ORGANIZED HEALTH CARE EDUCATION/TRAINING PROGRAM

## 2024-12-17 PROCEDURE — G8484 FLU IMMUNIZE NO ADMIN: HCPCS | Performed by: STUDENT IN AN ORGANIZED HEALTH CARE EDUCATION/TRAINING PROGRAM

## 2024-12-17 NOTE — PROGRESS NOTES
[facility]      Neurosurgery clinic notes    Ms. Lopez presents for a 1 month postop visit after an L5-S1 ALIF and posterior instrumentation.  Patient reports she still having some neuropathic pain particularly on the right L5 region.  She occasionally gets some left buttock pain as well.  She reports she has some good days and some bad days.  She is still able to ambulate with a rolling walker.  CT abdomen and CT of the lumbar spine shows some fluid collection at the surgical site concerning for seroma.  Patient denies any fevers or chills.  No signs of wound dehiscence.  Patient saw Dr. Clayton, vascular surgeon who per patient did not feel her abdominal sensation was due to a hernia.  Patient was started on some antibiotics.    On exam patient seem to be 4+ on the right lower extremity and 5 out of 5 on the left lower extremity.  Previous incision seem to be healed and intact.    Assessment and plan  This is a 49-year-old lady who presented for 1 month postop visit after an L5-S1 ALIF with posterior instrumentation    From the surgery standpoint I think patient is slowly progressing towards the right direction.  Patient still has some residual pain which I believe will get better with time.  Would like to continue to monitor patient clinically.  I advised patient if there is any concern for fevers chills or wound breakdown to reach out to the office as soon as possible.  At this point patient is cleared to stop mobilizing.  Patient can increase weight limit to 15 pounds and slowly increase 5 pounds per week.  Advised patient is cleared to drive as long as she is not on any pain medicine.  I see patient back in 3 months with an x-ray.  If patient continues to have persistent right radicular pain we may consider reexploration and foraminotomy in the future.    ALON JENNINGS MD  Neurosurgeon  [facility]  [facility]   Office Number:

## 2024-12-18 NOTE — PROGRESS NOTES
VASCULAR SURGERY   [address] [address]  [phone] FAX: [phone]        Jenna Lopez  : 1975    Reason for visit: Abdominal incisional pain, s/p ALIF     Chief Complaint: 49 y.o. female s/p ALIF with left incisional pain and palpable mass. Incision is intact and she denies drainage or erythema. CTA with findings consistent with seroma.     Plan:   Abdominal incisional seroma: Ppx doxycycline for 10 days. F/u PRN. If she develops incisional drainage, or persistent pain.     Imaging interrupted:   CT Abd/pelvis  1.  Limited exam due to field-of-view with abdomen only excluding the area of  clinical concern and relevant findings. Additional limitation in the absence of  intravenous contrast. Recommend CT abdomen and pelvis with intravenous contrast  and/or dedicated ultrasound of the area of clinical concern.  2.  Partially imaged low-attenuation subcutaneous fluid collection containing  fat in the LEFT lower quadrant anterior abdominal wall measuring 4.5 x 3.5 cm.  Differential diagnosis includes postop seroma, evolving hematoma or abscess.   3.  Additional partially imaged, mixed attenuation subcutaneous fluid collection  posterior to the paraspinal spinal muscles at the level of the fusion measuring  approximately 9.3 x 3.4 cm, favored postop seroma/evolving hematoma.  Differential includes abscess.    Physical Examination:   Height: 1.524 m (5'), Weight - Scale: 88 kg (194 lb), BP: 112/75    General: No acute distress  HENT: AT  CV: Regular rhythm.    LUNG: No respiratory distress on RA  Abdominal: No distension.  Extremities: No wounds or edema, motor and sensation grossly intact  Vascular:   Radial:  L    2+     R    2+    Femoral: L    2+     R    2+    Past Medical History:   Diagnosis Date    Anxiety     AR (allergic rhinitis)     Arthritis     back    Asthma     rescue inhaler    Chronic back pain          Chronic pain syndrome     Depression

## 2024-12-22 NOTE — PROGRESS NOTES
tenderness to palpation. Wounds are clean, dry and intact without signs of infection.     Discussed the patient's BMI with her.  The BMI follow up plan is as follows:    Plan:  She is doing well without any major concerns.  She will continue a protein first diet, all questions were answered.  She will increase the current exercise routine.  She will continue the appropriate vitamins and supplements.  Complete annual labs for monitoring.   Return to the office in 1 year or sooner.     Time: I spent 20 minutes preparing to see patient (including chart review and preparation), obtaining and/or reviewing additional medical history, performing a physical exam and evaluation, documenting clinical information in the electronic health record, independently interpreting results, communicating results to patient, family or caregiver, and/or coordinating care.          Neftali Steel MD  12/22/2024

## 2024-12-24 ENCOUNTER — OFFICE VISIT (OUTPATIENT)
Dept: SURGERY | Age: 49
End: 2024-12-24
Payer: MEDICARE

## 2024-12-24 VITALS
HEART RATE: 79 BPM | DIASTOLIC BLOOD PRESSURE: 82 MMHG | BODY MASS INDEX: 38.28 KG/M2 | WEIGHT: 195 LBS | SYSTOLIC BLOOD PRESSURE: 136 MMHG | HEIGHT: 60 IN

## 2024-12-24 DIAGNOSIS — Z71.3 NUTRITIONAL COUNSELING: ICD-10-CM

## 2024-12-24 DIAGNOSIS — E66.01 MORBID OBESITY: ICD-10-CM

## 2024-12-24 DIAGNOSIS — Z98.84 S/P LAPAROSCOPIC SLEEVE GASTRECTOMY: ICD-10-CM

## 2024-12-24 DIAGNOSIS — E66.01 MORBID OBESITY: Primary | ICD-10-CM

## 2024-12-24 DIAGNOSIS — Z71.82 EXERCISE COUNSELING: ICD-10-CM

## 2024-12-24 DIAGNOSIS — E66.812 OBESITY, CLASS II, BMI 35-39.9: ICD-10-CM

## 2024-12-24 DIAGNOSIS — Z13.21 ENCOUNTER FOR VITAMIN DEFICIENCY SCREENING: ICD-10-CM

## 2024-12-24 LAB
25(OH)D3 SERPL-MCNC: 35 NG/ML (ref 30–100)
ALBUMIN SERPL-MCNC: 3.5 G/DL (ref 3.5–5)
ALBUMIN/GLOB SERPL: 1.1 (ref 1–1.9)
ALP SERPL-CCNC: 98 U/L (ref 35–104)
ALT SERPL-CCNC: 15 U/L (ref 8–45)
ANION GAP SERPL CALC-SCNC: 9 MMOL/L (ref 7–16)
AST SERPL-CCNC: 24 U/L (ref 15–37)
BASOPHILS # BLD: 0.1 K/UL (ref 0–0.2)
BASOPHILS NFR BLD: 1 % (ref 0–2)
BILIRUB SERPL-MCNC: 0.9 MG/DL (ref 0–1.2)
BUN SERPL-MCNC: 7 MG/DL (ref 6–23)
CALCIUM SERPL-MCNC: 9.4 MG/DL (ref 8.8–10.2)
CHLORIDE SERPL-SCNC: 102 MMOL/L (ref 98–107)
CO2 SERPL-SCNC: 27 MMOL/L (ref 20–29)
CREAT SERPL-MCNC: 0.78 MG/DL (ref 0.6–1.1)
DIFFERENTIAL METHOD BLD: ABNORMAL
EOSINOPHIL # BLD: 0.1 K/UL (ref 0–0.8)
EOSINOPHIL NFR BLD: 2 % (ref 0.5–7.8)
ERYTHROCYTE [DISTWIDTH] IN BLOOD BY AUTOMATED COUNT: 14.6 % (ref 11.9–14.6)
FERRITIN SERPL-MCNC: 140 NG/ML (ref 8–388)
FOLATE SERPL-MCNC: 18.4 NG/ML (ref 3.1–17.5)
GLOBULIN SER CALC-MCNC: 3.1 G/DL (ref 2.3–3.5)
GLUCOSE SERPL-MCNC: 88 MG/DL (ref 70–99)
HCT VFR BLD AUTO: 41.6 % (ref 35.8–46.3)
HGB BLD-MCNC: 13.7 G/DL (ref 11.7–15.4)
IMM GRANULOCYTES # BLD AUTO: 0.1 K/UL (ref 0–0.5)
IMM GRANULOCYTES NFR BLD AUTO: 1 % (ref 0–5)
IRON SERPL-MCNC: 99 UG/DL (ref 35–100)
LYMPHOCYTES # BLD: 2 K/UL (ref 0.5–4.6)
LYMPHOCYTES NFR BLD: 33 % (ref 13–44)
MAGNESIUM SERPL-MCNC: 2 MG/DL (ref 1.8–2.4)
MCH RBC QN AUTO: 26.9 PG (ref 26.1–32.9)
MCHC RBC AUTO-ENTMCNC: 32.9 G/DL (ref 31.4–35)
MCV RBC AUTO: 81.7 FL (ref 82–102)
MONOCYTES # BLD: 0.4 K/UL (ref 0.1–1.3)
MONOCYTES NFR BLD: 6 % (ref 4–12)
NEUTS SEG # BLD: 3.3 K/UL (ref 1.7–8.2)
NEUTS SEG NFR BLD: 57 % (ref 43–78)
NRBC # BLD: 0 K/UL (ref 0–0.2)
PLATELET # BLD AUTO: 294 K/UL (ref 150–450)
PLATELET COMMENT: ABNORMAL
PMV BLD AUTO: 11.5 FL (ref 9.4–12.3)
POTASSIUM SERPL-SCNC: 4.2 MMOL/L (ref 3.5–5.1)
PROT SERPL-MCNC: 6.6 G/DL (ref 6.3–8.2)
RBC # BLD AUTO: 5.09 M/UL (ref 4.05–5.2)
SODIUM SERPL-SCNC: 138 MMOL/L (ref 136–145)
TSH, 3RD GENERATION: 1.88 UIU/ML (ref 0.27–4.2)
VIT B12 SERPL-MCNC: 328 PG/ML (ref 193–986)
WBC # BLD AUTO: 6 K/UL (ref 4.3–11.1)
WBC MORPH BLD: ABNORMAL

## 2024-12-24 PROCEDURE — G8427 DOCREV CUR MEDS BY ELIG CLIN: HCPCS | Performed by: SURGERY

## 2024-12-24 PROCEDURE — G8484 FLU IMMUNIZE NO ADMIN: HCPCS | Performed by: SURGERY

## 2024-12-24 PROCEDURE — 99213 OFFICE O/P EST LOW 20 MIN: CPT | Performed by: SURGERY

## 2024-12-24 PROCEDURE — 3075F SYST BP GE 130 - 139MM HG: CPT | Performed by: SURGERY

## 2024-12-24 PROCEDURE — 1036F TOBACCO NON-USER: CPT | Performed by: SURGERY

## 2024-12-24 PROCEDURE — 3079F DIAST BP 80-89 MM HG: CPT | Performed by: SURGERY

## 2024-12-24 PROCEDURE — G8417 CALC BMI ABV UP PARAM F/U: HCPCS | Performed by: SURGERY

## 2024-12-29 LAB
VIT B1 BLD-SCNC: 102.7 NMOL/L (ref 66.5–200)
VIT B6 SERPL-MCNC: 13.2 UG/L (ref 3.4–65.2)

## 2024-12-31 LAB
NIACIN SERPL-MCNC: <5 NG/ML (ref 0–5)
NICOTINAMIDE SERPL-MCNC: 35.3 NG/ML (ref 5.2–72.1)

## 2025-03-03 ENCOUNTER — TRANSCRIBE ORDERS (OUTPATIENT)
Dept: SCHEDULING | Age: 50
End: 2025-03-03

## 2025-03-03 DIAGNOSIS — Z12.31 VISIT FOR SCREENING MAMMOGRAM: Primary | ICD-10-CM

## 2025-03-18 ENCOUNTER — HOSPITAL ENCOUNTER (OUTPATIENT)
Dept: GENERAL RADIOLOGY | Age: 50
Discharge: HOME | End: 2025-03-20
Payer: COMMERCIAL

## 2025-03-18 ENCOUNTER — OFFICE VISIT (OUTPATIENT)
Dept: NEUROSURGERY | Age: 50
End: 2025-03-18
Payer: COMMERCIAL

## 2025-03-18 DIAGNOSIS — G89.4 CHRONIC PAIN SYNDROME: ICD-10-CM

## 2025-03-18 DIAGNOSIS — M43.17 SPONDYLOLISTHESIS, LUMBOSACRAL REGION: ICD-10-CM

## 2025-03-18 DIAGNOSIS — Z98.1 S/P LUMBAR SPINAL FUSION: Primary | ICD-10-CM

## 2025-03-18 DIAGNOSIS — M47.9 SPONDYLOSIS: ICD-10-CM

## 2025-03-18 DIAGNOSIS — E66.01 MORBID OBESITY WITH BMI OF 45.0-49.9, ADULT: ICD-10-CM

## 2025-03-18 DIAGNOSIS — M96.1 POSTLAMINECTOMY SYNDROME, LUMBAR REGION: ICD-10-CM

## 2025-03-18 PROCEDURE — 99214 OFFICE O/P EST MOD 30 MIN: CPT | Performed by: STUDENT IN AN ORGANIZED HEALTH CARE EDUCATION/TRAINING PROGRAM

## 2025-03-18 PROCEDURE — 72100 X-RAY EXAM L-S SPINE 2/3 VWS: CPT

## 2025-03-18 PROCEDURE — G8417 CALC BMI ABV UP PARAM F/U: HCPCS | Performed by: STUDENT IN AN ORGANIZED HEALTH CARE EDUCATION/TRAINING PROGRAM

## 2025-03-18 PROCEDURE — 1036F TOBACCO NON-USER: CPT | Performed by: STUDENT IN AN ORGANIZED HEALTH CARE EDUCATION/TRAINING PROGRAM

## 2025-03-18 PROCEDURE — G8428 CUR MEDS NOT DOCUMENT: HCPCS | Performed by: STUDENT IN AN ORGANIZED HEALTH CARE EDUCATION/TRAINING PROGRAM

## 2025-03-18 NOTE — PROGRESS NOTES
(LYRICA) 150 MG capsule Take 1 capsule by mouth as needed.      phentermine (ADIPEX-P) 37.5 MG tablet Take 1 tablet by mouth daily.      BELBUCA 300 MCG FILM 300 mcg as needed.      omeprazole (PRILOSEC) 40 MG delayed release capsule Take 1 capsule by mouth every morning (before breakfast) 90 capsule 0    cetirizine (ZYRTEC) 10 MG tablet TAKE 1 TABLET (10 MG TOTAL) BY MOUTH IN THE MORNING (Patient not taking: Reported on 10/25/2024)      baclofen (LIORESAL) 10 MG tablet Take 1 tablet by mouth as needed      Multiple Vitamin (MULTIVITAMIN PO) Take 1 tablet by mouth daily      acetaminophen (TYLENOL) 500 MG tablet Take 1 tablet by mouth as needed      amLODIPine (NORVASC) 10 MG tablet Take 1 tablet by mouth daily      Azelastine-Fluticasone 137-50 MCG/ACT SUSP by Nasal route as needed      losartan (COZAAR) 50 MG tablet Take 1 tablet by mouth daily TAKE 1 TABLET BY MOUTH EVERY DAY       Current Facility-Administered Medications   Medication Dose Route Frequency Provider Last Rate Last Admin    sodium chloride flush 0.9 % injection 10 mL  10 mL IntraVENous PRN    10 mL at 08/12/24 1007            Allergies    Allergies   Allergen Reactions    Latex Itching and Rash    Adhesive Tape Itching and Rash    Penicillins Hives, Itching and Swelling     Other Reaction(s): lips swelling    Pollen Extract Itching     Sneezing, pt has an allergist    Cat Dander      Sneezing    Dog Epithelium (Canis Lupus Familiaris)      Sneezing    Dust Mite Extract      Sneezing            Family History    Family History   Problem Relation Age of Onset    Stroke Mother     Diabetes Mother     Delayed Awakening Mother     Sleep Apnea Mother     Heart Disease Mother     Hypertension Mother     Dementia Mother     Seizures Mother     Cancer Father         prostate    Diabetes Father     Hypertension Father     Asthma Father     High Cholesterol Brother     Sleep Apnea Brother     Osteoarthritis Other     Other Other         brain tumor

## 2025-04-03 ENCOUNTER — HOSPITAL ENCOUNTER (OUTPATIENT)
Dept: MAMMOGRAPHY | Age: 50
Discharge: HOME | End: 2025-04-03
Payer: COMMERCIAL

## 2025-04-03 VITALS — BODY MASS INDEX: 37.3 KG/M2 | WEIGHT: 190 LBS | HEIGHT: 60 IN

## 2025-04-03 DIAGNOSIS — Z12.31 VISIT FOR SCREENING MAMMOGRAM: ICD-10-CM

## 2025-04-03 PROCEDURE — 77063 BREAST TOMOSYNTHESIS BI: CPT

## 2025-04-10 ENCOUNTER — HOSPITAL ENCOUNTER (OUTPATIENT)
Dept: GENERAL RADIOLOGY | Age: 50
Discharge: HOME | End: 2025-04-12
Payer: COMMERCIAL

## 2025-04-10 DIAGNOSIS — G89.4 CHRONIC PAIN SYNDROME: ICD-10-CM

## 2025-04-10 DIAGNOSIS — M96.1 POSTLAMINECTOMY SYNDROME, LUMBAR REGION: ICD-10-CM

## 2025-04-10 DIAGNOSIS — E66.01 MORBID OBESITY WITH BMI OF 45.0-49.9, ADULT: ICD-10-CM

## 2025-04-10 DIAGNOSIS — M43.17 SPONDYLOLISTHESIS, LUMBOSACRAL REGION: ICD-10-CM

## 2025-04-10 DIAGNOSIS — Z98.1 S/P LUMBAR SPINAL FUSION: ICD-10-CM

## 2025-04-10 PROCEDURE — 73521 X-RAY EXAM HIPS BI 2 VIEWS: CPT

## 2025-06-19 ENCOUNTER — OFFICE VISIT (OUTPATIENT)
Dept: NEUROSURGERY | Age: 50
End: 2025-06-19

## 2025-06-19 ENCOUNTER — HOSPITAL ENCOUNTER (OUTPATIENT)
Dept: GENERAL RADIOLOGY | Age: 50
Discharge: HOME | End: 2025-06-21
Payer: MEDICARE

## 2025-06-19 VITALS — WEIGHT: 190 LBS | HEIGHT: 60 IN | BODY MASS INDEX: 37.3 KG/M2

## 2025-06-19 DIAGNOSIS — M96.1 POSTLAMINECTOMY SYNDROME, LUMBAR REGION: ICD-10-CM

## 2025-06-19 DIAGNOSIS — M54.17 LUMBOSACRAL RADICULOPATHY: ICD-10-CM

## 2025-06-19 DIAGNOSIS — Z98.1 S/P LUMBAR SPINAL FUSION: Primary | ICD-10-CM

## 2025-06-19 DIAGNOSIS — M48.07 FORAMINAL STENOSIS OF LUMBOSACRAL REGION: ICD-10-CM

## 2025-06-19 PROCEDURE — 73502 X-RAY EXAM HIP UNI 2-3 VIEWS: CPT

## 2025-06-19 PROCEDURE — 72100 X-RAY EXAM L-S SPINE 2/3 VWS: CPT

## 2025-06-19 NOTE — PROGRESS NOTES
or back. Her sleep is disrupted due to the pain, necessitating her to sleep on her back. She has undergone multiple nerve injections under the care of Dr. LANCASTER. She has a scheduled appointment with Dr. LANCASTER next week. She expresses interest in trying a TENS unit for pain management. She engages in walking and exercise, which necessitate the use of Tylenol for pain management. Her current medication regimen includes Lyrica and Celebrex. She has a history of using a pain stimulator, which was discontinued due to infection. She reports that the temporary stimulator was beneficial, but her body rejected the permanent one.    Patient continues to have position dependent bilateral L5 radiculopathy and bilateral hip pain.  At this point we will order an x-ray of the bilateral hips.  Will refer patient to see her pain management doctor for a selective L5 nerve root block bilaterally.  If patient gets some relief from the selective nerve root block we will consider bilateral foraminotomy and facetectomy as well as left S1 screw revision.     Will see patient back in 2 months time       Red Flag Signs Acute  (Days) Subacute  (Weeks) Chronic  (Months) Absent   Incontinence  []  []  []  [x]   Severe Weakness  []  []  []  [x]   Severe Sensory Loss  []  []  []  [x]   Gait Disturbance  []  []  []  [x]         Medical History    Past Medical History:   Diagnosis Date    Anxiety     AR (allergic rhinitis)     Arthritis     back    Asthma     rescue inhaler    Chronic back pain          Chronic pain syndrome     Depression     GERD (gastroesophageal reflux disease)     H/O gastric sleeve     History of methicillin resistant Staph aureus     back area after spinal cord stimulator removal    Hypertension     managed with meds    Low back pain     Lumbosacral disc disease     Morbid obesity (HCC)      BMI:37.5    Neuropathy of right foot          Pure hypercholesterolemia     Sciatica     Seasonal allergies     Sinus problem

## (undated) PROCEDURE — 0ST20ZZ RESECTION OF LUMBAR VERTEBRAL DISC, OPEN APPROACH: ICD-10-PCS

## (undated) PROCEDURE — 0SG30K1 FUSION OF LUMBOSACRAL JOINT WITH NONAUTOLOGOUS TISSUE SUBSTITUTE, POSTERIOR APPROACH, POSTERIOR COLUMN, OPEN APPROACH: ICD-10-PCS

## (undated) PROCEDURE — 0SG30A0 FUSION OF LUMBOSACRAL JOINT WITH INTERBODY FUSION DEVICE, ANTERIOR APPROACH, ANTERIOR COLUMN, OPEN APPROACH: ICD-10-PCS

## (undated) DEVICE — VISIGI 3D®  CALIBRATION SYSTEM  SIZE 40FR SLEEVE/STD: Brand: BOEHRINGER® VISIGI™ 3D SLEEVE GASTRECTOMY CALIBRATION SYSTEM, SIZE 40FR

## (undated) DEVICE — SUTURE STRATAFIX SYMMETRIC SZ 1 L18IN ABSRB VLT CT1 L36CM SXPP1A404

## (undated) DEVICE — Device: Brand: PORTEX

## (undated) DEVICE — Device

## (undated) DEVICE — INTENDED FOR TISSUE SEPARATION, AND OTHER PROCEDURES THAT REQUIRE A SHARP SURGICAL BLADE TO PUNCTURE OR CUT.: Brand: BARD-PARKER SAFETY BLADES SIZE 15, STERILE

## (undated) DEVICE — DRAIN CLOSED WOUND W/TROCAR 10FR MED (HEMOVAC)

## (undated) DEVICE — GARMENT,MEDLINE,DVT,INT,CALF,MED, GEN2: Brand: MEDLINE

## (undated) DEVICE — C-ARM: Brand: UNBRANDED

## (undated) DEVICE — SUTURE VICRYL SZ 2-0 L18IN ABSRB UD CT-1 L36MM 1/2 CIR J839D

## (undated) DEVICE — TRAY EPI PERIFIX CONT 17GAX3.5IN TUOHY 19GA SPRINGWOUND OPN

## (undated) DEVICE — TOWEL,OR,DSP,ST,NATURAL,DLX,4/PK,20PK/CS: Brand: MEDLINE

## (undated) DEVICE — SPONGE,NEURO,0.5"X0.5",XR,STRL,10/PK: Brand: MEDLINE

## (undated) DEVICE — NEEDLE SPNL 22GA L3.5IN BLK HUB S STL REG WALL FIT STYL

## (undated) DEVICE — SUTURE ABSORBABLE BRAIDED 2-0 CT-1 27 IN UD VICRYL J259H

## (undated) DEVICE — APPLICATOR LAP 35 CM 2 RIGID VISTASEAL

## (undated) DEVICE — APPLICATOR MEDICATED 26 CC SOLUTION HI LT ORNG CHLORAPREP

## (undated) DEVICE — SHEET,DRAPE,53X77,STERILE: Brand: MEDLINE

## (undated) DEVICE — MEDI-VAC NON-CONDUCTIVE SUCTION TUBING: Brand: CARDINAL HEALTH

## (undated) DEVICE — ADHESIVE SKIN CLOSURE WND 8.661X1.5 IN 22 CM LIQUIBAND SECUR

## (undated) DEVICE — TROCAR: Brand: KII FIOS FIRST ENTRY

## (undated) DEVICE — UNIVERSAL DRAPES: Brand: MEDLINE INDUSTRIES, INC.

## (undated) DEVICE — JACKSON TABLE POSITIONER KIT: Brand: MEDLINE INDUSTRIES, INC.

## (undated) DEVICE — SPONGE,NEURO,0.5"X3",XR,STRL,LF,10/PK: Brand: MEDLINE

## (undated) DEVICE — ELECTRODE BLDE L6.5IN CAUT EXT DISP

## (undated) DEVICE — RELOAD STPL L60MM H2.3-4.2MM VERY THCK TISS BLK 6 ROW

## (undated) DEVICE — 1LYRTR 16FR10ML 100%SILI SNAP: Brand: MEDLINE INDUSTRIES, INC.

## (undated) DEVICE — DRAPE IRRIG FLD WRM W44XL44IN W/ AORN STD PRTBL INTRATEMP

## (undated) DEVICE — DISPOSABLE DRAPE, STERILE, FOR A CDS-3060 5 FOOT TABLE: Brand: PEDIGO PRODUCTS, INC.

## (undated) DEVICE — TISSUE RETRIEVAL SYSTEM: Brand: INZII RETRIEVAL SYSTEM

## (undated) DEVICE — DRAPE SHT 3 QTR PROXIMA 53X77 --

## (undated) DEVICE — AGENT HEMOSTATIC SURGIFLOW MATRIX KIT W/THROMBIN

## (undated) DEVICE — UNIVERSAL PACK: Brand: CONVERTORS

## (undated) DEVICE — STAPLER SKIN L440MM 32MM LNG 12 FIRING B FRM PWR + GRIPPING

## (undated) DEVICE — SEALANT TISS 4 CC FIBRIN VISTASEAL

## (undated) DEVICE — DISPOSABLE BIPOLAR CODE, 12' (3.66 M): Brand: CONMED

## (undated) DEVICE — LIQUIBAND RAPID ADHESIVE 36/CS 0.8ML: Brand: MEDLINE

## (undated) DEVICE — SOLUTION IRRIG 1000ML 0.9% SOD CHL USP POUR PLAS BTL

## (undated) DEVICE — E-Z CLEAN, PTFE COATED, ELECTROSURGICAL LAPAROSCOPIC ELECTRODE, SPATULA, 33 CM., SINGLE-USE, FOR USE WITH HAND CONTROL PENCIL: Brand: MEGADYNE

## (undated) DEVICE — GASTRIC SLEEVE: Brand: MEDLINE INDUSTRIES, INC.

## (undated) DEVICE — DRAPE TWL SURG 16X26IN BLU ORB04] ALLCARE INC]

## (undated) DEVICE — CANISTER, RIGID, 2000CC: Brand: MEDLINE INDUSTRIES, INC.

## (undated) DEVICE — SUTURE PDS + SZ 1 L96IN ABSRB VLT L65MM TP-1 1/2 CIR PDP880G

## (undated) DEVICE — BLADE ES ELASTOMERIC COAT INSUL DURABLE BEND UPTO 90DEG

## (undated) DEVICE — SPONGE,NEURO,0.5"X1",XR,STRL,LF,10/PK: Brand: MEDLINE

## (undated) DEVICE — TUBING, SUCTION, 1/4" X 10', STRAIGHT: Brand: MEDLINE

## (undated) DEVICE — CONTAINER,SPECIMEN,O.R.STRL,4.5OZ: Brand: MEDLINE

## (undated) DEVICE — ABSORBENT, WATERPROOF, BACTERIA PROOF FILM DRESSING: Brand: OPSITE POST OP 9.5X8.5CM CTN 20

## (undated) DEVICE — PLASTIC ADHESIVE BANDAGE: Brand: CURITY

## (undated) DEVICE — BIPOLAR SEALER 23-112-1 AQM 6.0: Brand: AQUAMANTYS ®

## (undated) DEVICE — SYR 10ML LUER LOK 1/5ML GRAD --

## (undated) DEVICE — 3M™ TEGADERM™ TRANSPARENT FILM DRESSING FRAME STYLE, 1626W, 4 IN X 4-3/4 IN (10 CM X 12 CM), 50/CT 4CT/CASE: Brand: 3M™ TEGADERM™

## (undated) DEVICE — DISPOSABLE STANDARD BIPOLAR FORCEPS, NON-STICK,: Brand: SPETZLER-MALIS

## (undated) DEVICE — BLADE CLIPPER GEN PURP NS

## (undated) DEVICE — SUTURE MCRYL SZ 4-0 L27IN ABSRB UD L19MM PS-2 1/2 CIR PRIM Y426H

## (undated) DEVICE — SUTURE MONOCRYL STRATAFIX SPRL + SZ 3-0 L18IN ABSRB UD PS-2 SXMP1B107

## (undated) DEVICE — FLEXIBLE INTRODUCER CANNULA W/ 17G X 3.5" TUOHY  METAL HUB: Brand: EPIMED

## (undated) DEVICE — SPINE NEURO: Brand: MEDLINE INDUSTRIES, INC.

## (undated) DEVICE — TUBING INSUFFLATION SMK EVAC HI FLO SET PNEUMOCLEAR

## (undated) DEVICE — SCALPEL BONE 20MM BLUNT BLADE AND TUBESET NEXUS

## (undated) DEVICE — CARBIDE MATCH HEAD

## (undated) DEVICE — BREVI-XL™ 19G X 14": Brand: EPIMED

## (undated) DEVICE — SYRINGE MED 10ML LUERLOCK TIP W/O SFTY DISP

## (undated) DEVICE — CARDINAL HEALTH FLEXIBLE LIGHT HANDLE COVER: Brand: CARDINAL HEALTH

## (undated) DEVICE — NEEDLE SPNL L3.5IN PNK HUB S STL REG WALL FIT STYL W/ QNCKE

## (undated) DEVICE — NON-DEHP CATHETER EXTENSION SET, INJECTION SITE, MALE LUER LOCK ADAPTER: Brand: INTERLINK

## (undated) DEVICE — APPLIER CLP L SHFT DIA12MM 20 ROT MULT LIGACLP

## (undated) DEVICE — SUTURE ETHIBOND EXCEL SZ 0 L18IN NONABSORBABLE GRN L26MM MO-6 CX45D

## (undated) DEVICE — RELOAD STPL L60MM H1.5-3.6MM REG TISS BLU GRIPPING SURF B

## (undated) DEVICE — PUMP SUC IRR TBNG L10FT W/ HNDPC ASSEMB STRYKEFLOW 2

## (undated) DEVICE — SUTURE PERMAHAND SZ 3-0 L18IN NONABSORBABLE BLK SILK BRAID A184H

## (undated) DEVICE — THREE QUARTER SHEET: Brand: CONVERTORS

## (undated) DEVICE — SEALER TISS L45CM DIA5MM ARTC ADV BPLR STR TIP LAP APPRCH

## (undated) DEVICE — SUTURE PDS II SZ 1 L96IN ABSRB VLT TP-1 L65MM 1/2 CIR Z880G

## (undated) DEVICE — PENCIL ES L3M BTTN SWCH HOLSTER W/ BLDE ELECTRD EDGE

## (undated) DEVICE — NEURO SPINE: Brand: MEDLINE INDUSTRIES, INC.

## (undated) DEVICE — NEEDLE EPI L3.5IN OD20GA CLR POLYCARB HUB WNG N DEHP TUOHY

## (undated) DEVICE — TROCAR: Brand: KII® OPTICAL ACCESS SYSTEM

## (undated) DEVICE — ABSORBENT, WATERPROOF, BACTERIA PROOF FILM DRESSING: Brand: OPSITE POST OP 20X10CM CTN 20

## (undated) DEVICE — MARKER SURG SKIN UTIL BLK REG TIP NONSMEARING W/ 6IN RUL

## (undated) DEVICE — TROCAR: Brand: KII® SLEEVE

## (undated) DEVICE — SURGIFOAM SPNG SZ 100

## (undated) DEVICE — SPONGE: SPECIALTY PEANUT XR 100/CS: Brand: MEDICAL ACTION INDUSTRIES